# Patient Record
Sex: FEMALE | Race: BLACK OR AFRICAN AMERICAN | NOT HISPANIC OR LATINO | Employment: FULL TIME | URBAN - METROPOLITAN AREA
[De-identification: names, ages, dates, MRNs, and addresses within clinical notes are randomized per-mention and may not be internally consistent; named-entity substitution may affect disease eponyms.]

---

## 2016-09-22 LAB
HIGH RISK HPV 16 (PRECISION): NEGATIVE
HIGH RISK HPV 18/45 (PRECISION): NEGATIVE
PAP RECOMMENDATION EXT: NORMAL
PAP SMEAR: NORMAL

## 2017-05-22 ENCOUNTER — HISTORICAL (OUTPATIENT)
Dept: INTERNAL MEDICINE | Facility: CLINIC | Age: 52
End: 2017-05-22

## 2017-05-22 LAB
ABS NEUT (OLG): 3.63 X10(3)/MCL (ref 2.1–9.2)
ALBUMIN SERPL-MCNC: 4.4 GM/DL (ref 3.4–5)
ALBUMIN/GLOB SERPL: 1 {RATIO}
ALP SERPL-CCNC: 53 UNIT/L (ref 20–120)
ALT SERPL-CCNC: 10 UNIT/L
APPEARANCE, UA: ABNORMAL
AST SERPL-CCNC: 20 UNIT/L
BACTERIA #/AREA URNS AUTO: ABNORMAL /[HPF]
BASOPHILS # BLD AUTO: 0.04 X10(3)/MCL
BASOPHILS NFR BLD AUTO: 1 % (ref 0–1)
BILIRUB SERPL-MCNC: 0.5 MG/DL
BILIRUB UR QL STRIP: NEGATIVE
BILIRUBIN DIRECT+TOT PNL SERPL-MCNC: <0.1 MG/DL
BILIRUBIN DIRECT+TOT PNL SERPL-MCNC: >0.4 MG/DL
BUN SERPL-MCNC: 14 MG/DL (ref 7–25)
CALCIUM SERPL-MCNC: 6.8 MG/DL (ref 8.4–10.3)
CHLORIDE SERPL-SCNC: 102 MMOL/L (ref 96–110)
CHOLEST SERPL-MCNC: 300 MG/DL
CHOLEST/HDLC SERPL: 3.9 {RATIO} (ref 0–4.4)
CO2 SERPL-SCNC: 27 MMOL/L (ref 24–32)
COLOR UR: YELLOW
CREAT SERPL-MCNC: 0.64 MG/DL (ref 0.7–1.1)
CREAT UR-MCNC: 346.4 MG/DL
DEPRECATED CALCIDIOL+CALCIFEROL SERPL-MC: 32.25 NG/ML (ref 30–80)
EOSINOPHIL # BLD AUTO: 0.09 X10(3)/MCL
EOSINOPHIL NFR BLD AUTO: 2 % (ref 0–5)
ERYTHROCYTE [DISTWIDTH] IN BLOOD BY AUTOMATED COUNT: 19.7 % (ref 11.5–14.5)
EST. AVERAGE GLUCOSE BLD GHB EST-MCNC: 157 MG/DL
GLOBULIN SER-MCNC: 4.3 GM/ML (ref 2.3–3.5)
GLUCOSE (UA): NORMAL
GLUCOSE SERPL-MCNC: 124 MG/DL (ref 65–99)
HAV IGM SERPL QL IA: NONREACTIVE
HBA1C MFR BLD: 7.1 % (ref 4.7–5.6)
HBV CORE IGM SERPL QL IA: NONREACTIVE
HBV SURFACE AG SERPL QL IA: NEGATIVE
HCT VFR BLD AUTO: 27.2 % (ref 35–46)
HCV AB SERPL QL IA: NONREACTIVE
HDLC SERPL-MCNC: 77 MG/DL
HGB BLD-MCNC: 7.7 GM/DL (ref 12–16)
HGB UR QL STRIP: NEGATIVE
HIV 1+2 AB+HIV1 P24 AG SERPL QL IA: NONREACTIVE
HYALINE CASTS #/AREA URNS LPF: ABNORMAL /[LPF]
IMM GRANULOCYTES # BLD AUTO: 0.02 10*3/UL
IMM GRANULOCYTES NFR BLD AUTO: 0 %
KETONES UR QL STRIP: ABNORMAL
LDLC SERPL CALC-MCNC: 196 MG/DL (ref 0–130)
LEUKOCYTE ESTERASE UR QL STRIP: 75 LEU/UL
LYMPHOCYTES # BLD AUTO: 2.12 X10(3)/MCL
LYMPHOCYTES NFR BLD AUTO: 34 % (ref 15–40)
MCH RBC QN AUTO: 17.3 PG (ref 26–34)
MCHC RBC AUTO-ENTMCNC: 28.3 GM/DL (ref 31–37)
MCV RBC AUTO: 61.3 FL (ref 80–100)
MICROALBUMIN UR-MCNC: 17.3 MG/L (ref 0–19)
MICROALBUMIN/CREAT RATIO PNL UR: 5 MCG/MG CR (ref 0–29)
MONOCYTES # BLD AUTO: 0.3 X10(3)/MCL
MONOCYTES NFR BLD AUTO: 5 % (ref 4–12)
NEUTROPHILS # BLD AUTO: 3.63 X10(3)/MCL
NEUTROPHILS NFR BLD AUTO: 59 X10(3)/MCL
NITRITE UR QL STRIP: NEGATIVE
PH UR STRIP: 5.5 [PH] (ref 4.5–8)
PLATELET # BLD AUTO: 451 X10(3)/MCL (ref 130–400)
PMV BLD AUTO: 9.8 FL (ref 7.4–10.4)
POTASSIUM SERPL-SCNC: 3.5 MMOL/L (ref 3.6–5.2)
PROT SERPL-MCNC: 8.7 GM/DL (ref 6–8)
PROT UR QL STRIP: 20 MG/DL
RBC # BLD AUTO: 4.44 X10(6)/MCL (ref 4–5.2)
RBC #/AREA URNS AUTO: ABNORMAL /[HPF]
SODIUM SERPL-SCNC: 139 MMOL/L (ref 135–146)
SP GR UR STRIP: 1.02 (ref 1–1.03)
SQUAMOUS #/AREA URNS LPF: >100 /[LPF]
T4 FREE SERPL-MCNC: 0.84 NG/DL (ref 0.6–1.15)
TRIGL SERPL-MCNC: 133 MG/DL
TSH SERPL-ACNC: 12.76 MIU/L (ref 0.5–5)
UROBILINOGEN UR STRIP-ACNC: NORMAL
VLDLC SERPL CALC-MCNC: 27 MG/DL
WBC # SPEC AUTO: 6.2 X10(3)/MCL (ref 4.5–11)
WBC #/AREA URNS AUTO: ABNORMAL /HPF

## 2017-06-15 ENCOUNTER — HISTORICAL (OUTPATIENT)
Dept: INTERNAL MEDICINE | Facility: CLINIC | Age: 52
End: 2017-06-15

## 2017-06-28 ENCOUNTER — HISTORICAL (OUTPATIENT)
Dept: ADMINISTRATIVE | Facility: HOSPITAL | Age: 52
End: 2017-06-28

## 2017-06-28 LAB
BUN SERPL-MCNC: 12 MG/DL (ref 7–18)
CALCIUM SERPL-MCNC: 8.2 MG/DL (ref 8.5–10.1)
CHLORIDE SERPL-SCNC: 102 MMOL/L (ref 98–107)
CO2 SERPL-SCNC: 30 MMOL/L (ref 21–32)
CREAT SERPL-MCNC: 0.7 MG/DL (ref 0.6–1.3)
DEPRECATED CALCIDIOL+CALCIFEROL SERPL-MC: 34.4 NG/ML (ref 30–80)
GLUCOSE SERPL-MCNC: 132 MG/DL (ref 74–106)
HCT VFR BLD AUTO: 34.8 % (ref 35–46)
HGB BLD-MCNC: 10.4 GM/DL (ref 12–16)
MAGNESIUM SERPL-MCNC: 1.8 MG/DL (ref 1.8–2.4)
PHOSPHATE SERPL-MCNC: 4.2 MG/DL (ref 2.5–4.9)
POTASSIUM SERPL-SCNC: 3.8 MMOL/L (ref 3.5–5.1)
SODIUM SERPL-SCNC: 141 MMOL/L (ref 136–145)

## 2017-08-17 ENCOUNTER — HISTORICAL (OUTPATIENT)
Dept: ADMINISTRATIVE | Facility: HOSPITAL | Age: 52
End: 2017-08-17

## 2018-11-23 ENCOUNTER — HISTORICAL (OUTPATIENT)
Dept: INTERNAL MEDICINE | Facility: CLINIC | Age: 53
End: 2018-11-23

## 2019-03-29 ENCOUNTER — HISTORICAL (OUTPATIENT)
Dept: INTERNAL MEDICINE | Facility: CLINIC | Age: 54
End: 2019-03-29

## 2019-03-29 LAB
ABS NEUT (OLG): 2.57 X10(3)/MCL (ref 2.1–9.2)
ALBUMIN SERPL-MCNC: 3.8 GM/DL (ref 3.4–5)
ALBUMIN/GLOB SERPL: 0.9 RATIO (ref 1.1–2)
ALP SERPL-CCNC: 61 UNIT/L (ref 45–117)
ALT SERPL-CCNC: 12 UNIT/L (ref 12–78)
AST SERPL-CCNC: 21 UNIT/L (ref 15–37)
BASOPHILS # BLD AUTO: 0.02 X10(3)/MCL
BASOPHILS NFR BLD AUTO: 0 %
BILIRUB SERPL-MCNC: 0.3 MG/DL (ref 0.2–1)
BILIRUBIN DIRECT+TOT PNL SERPL-MCNC: <0.1 MG/DL
BILIRUBIN DIRECT+TOT PNL SERPL-MCNC: ABNORMAL MG/DL
BUN SERPL-MCNC: 12 MG/DL (ref 7–18)
CALCIUM SERPL-MCNC: 6.6 MG/DL (ref 8.5–10.1)
CHLORIDE SERPL-SCNC: 104 MMOL/L (ref 98–107)
CHOLEST SERPL-MCNC: 266 MG/DL
CHOLEST/HDLC SERPL: 4 {RATIO} (ref 0–4.4)
CO2 SERPL-SCNC: 27 MMOL/L (ref 21–32)
CREAT SERPL-MCNC: 0.7 MG/DL (ref 0.6–1.3)
CREAT UR-MCNC: 156 MG/DL
DEPRECATED CALCIDIOL+CALCIFEROL SERPL-MC: 43.18 NG/ML (ref 30–80)
EOSINOPHIL # BLD AUTO: 0.06 X10(3)/MCL
EOSINOPHIL NFR BLD AUTO: 1 %
ERYTHROCYTE [DISTWIDTH] IN BLOOD BY AUTOMATED COUNT: 12.7 % (ref 11.5–14.5)
EST. AVERAGE GLUCOSE BLD GHB EST-MCNC: 177 MG/DL
GLOBULIN SER-MCNC: 4.4 GM/ML (ref 2.3–3.5)
GLUCOSE SERPL-MCNC: 104 MG/DL (ref 74–106)
HBA1C MFR BLD: 7.8 % (ref 4.2–6.3)
HCT VFR BLD AUTO: 36.9 % (ref 35–46)
HDLC SERPL-MCNC: 66 MG/DL
HGB BLD-MCNC: 12.1 GM/DL (ref 12–16)
IMM GRANULOCYTES # BLD AUTO: 0.01 10*3/UL
IMM GRANULOCYTES NFR BLD AUTO: 0 %
LDLC SERPL CALC-MCNC: 181 MG/DL (ref 0–130)
LYMPHOCYTES # BLD AUTO: 2.16 X10(3)/MCL
LYMPHOCYTES NFR BLD AUTO: 42 % (ref 13–40)
MCH RBC QN AUTO: 29.8 PG (ref 26–34)
MCHC RBC AUTO-ENTMCNC: 32.8 GM/DL (ref 31–37)
MCV RBC AUTO: 90.9 FL (ref 80–100)
MICROALBUMIN UR-MCNC: 24.9 MG/L (ref 0–19)
MICROALBUMIN/CREAT RATIO PNL UR: 16 MCG/MG CR (ref 0–29)
MONOCYTES # BLD AUTO: 0.3 X10(3)/MCL
MONOCYTES NFR BLD AUTO: 6 % (ref 4–12)
NEUTROPHILS # BLD AUTO: 2.57 X10(3)/MCL
NEUTROPHILS NFR BLD AUTO: 50 X10(3)/MCL
OCCULT BLOOD, FECAL, IA: POSITIVE
PLATELET # BLD AUTO: 343 X10(3)/MCL (ref 130–400)
PMV BLD AUTO: 11.6 FL (ref 7.4–10.4)
POTASSIUM SERPL-SCNC: 4.2 MMOL/L (ref 3.5–5.1)
PROT SERPL-MCNC: 8.2 GM/DL (ref 6.4–8.2)
RBC # BLD AUTO: 4.06 X10(6)/MCL (ref 4–5.2)
SODIUM SERPL-SCNC: 139 MMOL/L (ref 136–145)
T4 FREE SERPL-MCNC: 1.45 NG/DL (ref 0.76–1.46)
TRIGL SERPL-MCNC: 97 MG/DL
TSH SERPL-ACNC: 0.42 MIU/L (ref 0.36–3.74)
VLDLC SERPL CALC-MCNC: 19 MG/DL
WBC # SPEC AUTO: 5.1 X10(3)/MCL (ref 4.5–11)

## 2019-08-20 ENCOUNTER — HISTORICAL (OUTPATIENT)
Dept: ADMINISTRATIVE | Facility: HOSPITAL | Age: 54
End: 2019-08-20

## 2019-08-20 LAB
ALBUMIN SERPL-MCNC: 4.5 GM/DL (ref 3.4–5)
BUN SERPL-MCNC: 11 MG/DL (ref 7–18)
CALCIUM SERPL-MCNC: 8.5 MG/DL (ref 8.5–10.1)
CHLORIDE SERPL-SCNC: 103 MMOL/L (ref 98–107)
CHOLEST SERPL-MCNC: 232 MG/DL
CHOLEST/HDLC SERPL: 3 {RATIO} (ref 0–4.4)
CO2 SERPL-SCNC: 28 MMOL/L (ref 21–32)
CREAT SERPL-MCNC: 0.8 MG/DL (ref 0.6–1.3)
CREAT UR-MCNC: 242 MG/DL
DEPRECATED CALCIDIOL+CALCIFEROL SERPL-MC: 38.22 NG/ML (ref 30–80)
EST. AVERAGE GLUCOSE BLD GHB EST-MCNC: 154 MG/DL
GLUCOSE SERPL-MCNC: 129 MG/DL (ref 74–106)
HBA1C MFR BLD: 7 % (ref 4.2–6.3)
HDLC SERPL-MCNC: 78 MG/DL
LDLC SERPL CALC-MCNC: 135 MG/DL (ref 0–130)
MAGNESIUM SERPL-MCNC: 2 MG/DL (ref 1.6–2.6)
MICROALBUMIN UR-MCNC: 18.4 MG/L (ref 0–19)
MICROALBUMIN/CREAT RATIO PNL UR: 7.6 MCG/MG CR (ref 0–29)
PHOSPHATE SERPL-MCNC: 5.3 MG/DL (ref 2.5–4.9)
PHOSPHATE SERPL-MCNC: 5.3 MG/DL (ref 2.5–4.9)
POTASSIUM SERPL-SCNC: 4 MMOL/L (ref 3.5–5.1)
PTH-INTACT SERPL-MCNC: <6.3 PG/ML (ref 18.4–80.1)
SODIUM SERPL-SCNC: 140 MMOL/L (ref 136–145)
TRIGL SERPL-MCNC: 97 MG/DL
TSH SERPL-ACNC: 1.55 MIU/L (ref 0.36–3.74)
VLDLC SERPL CALC-MCNC: 19 MG/DL

## 2019-11-06 ENCOUNTER — HISTORICAL (OUTPATIENT)
Dept: ADMINISTRATIVE | Facility: HOSPITAL | Age: 54
End: 2019-11-06

## 2019-11-06 LAB
ABS NEUT (OLG): 2.06 X10(3)/MCL (ref 2.1–9.2)
ALBUMIN SERPL-MCNC: 3.7 GM/DL (ref 3.4–5)
ALBUMIN SERPL-MCNC: 3.7 GM/DL (ref 3.4–5)
ALBUMIN/GLOB SERPL: 0.9 RATIO (ref 1.1–2)
ALP SERPL-CCNC: 72 UNIT/L (ref 45–117)
ALT SERPL-CCNC: 12 UNIT/L (ref 12–78)
AST SERPL-CCNC: 15 UNIT/L (ref 15–37)
BASOPHILS # BLD AUTO: 0 X10(3)/MCL (ref 0–0.2)
BASOPHILS NFR BLD AUTO: 1 %
BILIRUB SERPL-MCNC: 0.3 MG/DL (ref 0.2–1)
BILIRUBIN DIRECT+TOT PNL SERPL-MCNC: 0.1 MG/DL (ref 0–0.2)
BILIRUBIN DIRECT+TOT PNL SERPL-MCNC: 0.2 MG/DL
BUN SERPL-MCNC: 13 MG/DL (ref 7–18)
BUN SERPL-MCNC: 13 MG/DL (ref 7–18)
CALCIUM SERPL-MCNC: 7.4 MG/DL (ref 8.5–10.1)
CALCIUM SERPL-MCNC: 7.4 MG/DL (ref 8.5–10.1)
CHLORIDE SERPL-SCNC: 107 MMOL/L (ref 98–107)
CHLORIDE SERPL-SCNC: 107 MMOL/L (ref 98–107)
CO2 SERPL-SCNC: 27 MMOL/L (ref 21–32)
CO2 SERPL-SCNC: 27 MMOL/L (ref 21–32)
CREAT SERPL-MCNC: 0.8 MG/DL (ref 0.6–1.3)
CREAT SERPL-MCNC: 0.8 MG/DL (ref 0.6–1.3)
EOSINOPHIL # BLD AUTO: 0.1 X10(3)/MCL (ref 0–0.9)
EOSINOPHIL NFR BLD AUTO: 1 %
ERYTHROCYTE [DISTWIDTH] IN BLOOD BY AUTOMATED COUNT: 12.3 % (ref 11.5–14.5)
GLOBULIN SER-MCNC: 3.9 GM/ML (ref 2.3–3.5)
GLUCOSE SERPL-MCNC: 100 MG/DL (ref 74–106)
GLUCOSE SERPL-MCNC: 100 MG/DL (ref 74–106)
HCT VFR BLD AUTO: 37.1 % (ref 35–46)
HGB BLD-MCNC: 12 GM/DL (ref 12–16)
IMM GRANULOCYTES # BLD AUTO: 0.01 10*3/UL
IMM GRANULOCYTES NFR BLD AUTO: 0 %
LYMPHOCYTES # BLD AUTO: 2 X10(3)/MCL (ref 0.6–4.6)
LYMPHOCYTES NFR BLD AUTO: 45 %
MCH RBC QN AUTO: 29.6 PG (ref 26–34)
MCHC RBC AUTO-ENTMCNC: 32.3 GM/DL (ref 31–37)
MCV RBC AUTO: 91.6 FL (ref 80–100)
MONOCYTES # BLD AUTO: 0.3 X10(3)/MCL (ref 0.1–1.3)
MONOCYTES NFR BLD AUTO: 6 %
NEUTROPHILS # BLD AUTO: 2.06 X10(3)/MCL (ref 2.1–9.2)
NEUTROPHILS NFR BLD AUTO: 46 %
PHOSPHATE SERPL-MCNC: 5.4 MG/DL (ref 2.5–4.9)
PLATELET # BLD AUTO: 353 X10(3)/MCL (ref 130–400)
PMV BLD AUTO: 10.6 FL (ref 7.4–10.4)
POTASSIUM SERPL-SCNC: 3.8 MMOL/L (ref 3.5–5.1)
POTASSIUM SERPL-SCNC: 3.8 MMOL/L (ref 3.5–5.1)
PROT SERPL-MCNC: 7.6 GM/DL (ref 6.4–8.2)
RBC # BLD AUTO: 4.05 X10(6)/MCL (ref 4–5.2)
SODIUM SERPL-SCNC: 141 MMOL/L (ref 136–145)
SODIUM SERPL-SCNC: 141 MMOL/L (ref 136–145)
TSH SERPL-ACNC: 0.4 MIU/L (ref 0.36–3.74)
WBC # SPEC AUTO: 4.5 X10(3)/MCL (ref 4.5–11)

## 2019-11-18 ENCOUNTER — HISTORICAL (OUTPATIENT)
Dept: ADMINISTRATIVE | Facility: HOSPITAL | Age: 54
End: 2019-11-18

## 2019-11-20 ENCOUNTER — HISTORICAL (OUTPATIENT)
Dept: ADMINISTRATIVE | Facility: HOSPITAL | Age: 54
End: 2019-11-20

## 2019-11-20 LAB
ALBUMIN SERPL-MCNC: 3.8 GM/DL (ref 3.4–5)
BUN SERPL-MCNC: 14 MG/DL (ref 7–18)
CALCIUM SERPL-MCNC: 8.1 MG/DL (ref 8.5–10.1)
CHLORIDE SERPL-SCNC: 106 MMOL/L (ref 98–107)
CO2 SERPL-SCNC: 26 MMOL/L (ref 21–32)
CREAT SERPL-MCNC: 0.7 MG/DL (ref 0.6–1.3)
GLUCOSE SERPL-MCNC: 110 MG/DL (ref 74–106)
PHOSPHATE SERPL-MCNC: 4.3 MG/DL (ref 2.5–4.9)
POTASSIUM SERPL-SCNC: 3.8 MMOL/L (ref 3.5–5.1)
SODIUM SERPL-SCNC: 140 MMOL/L (ref 136–145)

## 2019-12-11 ENCOUNTER — HISTORICAL (OUTPATIENT)
Dept: RADIOLOGY | Facility: HOSPITAL | Age: 54
End: 2019-12-11

## 2019-12-23 ENCOUNTER — HISTORICAL (OUTPATIENT)
Dept: RADIOLOGY | Facility: HOSPITAL | Age: 54
End: 2019-12-23

## 2020-02-24 ENCOUNTER — HISTORICAL (OUTPATIENT)
Dept: ADMINISTRATIVE | Facility: HOSPITAL | Age: 55
End: 2020-02-24

## 2020-02-24 LAB
ALBUMIN SERPL-MCNC: 3.6 GM/DL (ref 3.4–5)
BUN SERPL-MCNC: 14 MG/DL (ref 7–18)
CALCIUM SERPL-MCNC: 7.4 MG/DL (ref 8.5–10.1)
CHLORIDE SERPL-SCNC: 106 MMOL/L (ref 98–107)
CO2 SERPL-SCNC: 30 MMOL/L (ref 21–32)
CREAT SERPL-MCNC: 0.6 MG/DL (ref 0.6–1.3)
GLUCOSE SERPL-MCNC: 114 MG/DL (ref 74–106)
HIV 1+2 AB+HIV1 P24 AG SERPL QL IA: NONREACTIVE
PHOSPHATE SERPL-MCNC: 5.4 MG/DL (ref 2.5–4.9)
POTASSIUM SERPL-SCNC: 3.8 MMOL/L (ref 3.5–5.1)
SODIUM SERPL-SCNC: 140 MMOL/L (ref 136–145)
T PALLIDUM AB SER QL: NONREACTIVE
T4 FREE SERPL-MCNC: 1.21 NG/DL (ref 0.76–1.46)
TSH SERPL-ACNC: 0.06 MIU/L (ref 0.36–3.74)

## 2020-02-25 LAB
CALCIUM 24H UR-MCNC: 154.8 MG/24HR (ref 0–250)
CREAT 24H UR-MCNC: 0.8 GM/24HR (ref 0.6–2.1)
CREAT UR-MCNC: 42 MG/DL

## 2020-03-18 ENCOUNTER — HISTORICAL (OUTPATIENT)
Dept: NEPHROLOGY | Facility: CLINIC | Age: 55
End: 2020-03-18

## 2020-03-18 LAB
ALBUMIN SERPL-MCNC: 3.9 GM/DL (ref 3.4–5)
BUN SERPL-MCNC: 14 MG/DL (ref 7–18)
CALCIUM SERPL-MCNC: 7.1 MG/DL (ref 8.5–10.1)
CHLORIDE SERPL-SCNC: 106 MMOL/L (ref 98–107)
CO2 SERPL-SCNC: 29 MMOL/L (ref 21–32)
CREAT SERPL-MCNC: 0.7 MG/DL (ref 0.6–1.3)
GLUCOSE SERPL-MCNC: 79 MG/DL (ref 74–106)
PHOSPHATE SERPL-MCNC: 5.3 MG/DL (ref 2.5–4.9)
POTASSIUM SERPL-SCNC: 3.7 MMOL/L (ref 3.5–5.1)
SODIUM SERPL-SCNC: 141 MMOL/L (ref 136–145)

## 2020-05-06 ENCOUNTER — HISTORICAL (OUTPATIENT)
Dept: ADMINISTRATIVE | Facility: HOSPITAL | Age: 55
End: 2020-05-06

## 2020-05-06 LAB
ALBUMIN SERPL-MCNC: 4.2 GM/DL (ref 3.4–5)
BUN SERPL-MCNC: 14 MG/DL (ref 7–18)
CALCIUM SERPL-MCNC: 8.5 MG/DL (ref 8.5–10.1)
CHLORIDE SERPL-SCNC: 104 MMOL/L (ref 98–107)
CO2 SERPL-SCNC: 29 MMOL/L (ref 21–32)
CREAT SERPL-MCNC: 0.7 MG/DL (ref 0.6–1.3)
GLUCOSE SERPL-MCNC: 107 MG/DL (ref 74–106)
PHOSPHATE SERPL-MCNC: 4.8 MG/DL (ref 2.5–4.9)
POTASSIUM SERPL-SCNC: 3.6 MMOL/L (ref 3.5–5.1)
SODIUM SERPL-SCNC: 138 MMOL/L (ref 136–145)
TSH SERPL-ACNC: 0.54 MIU/L (ref 0.36–3.74)

## 2020-06-19 ENCOUNTER — HISTORICAL (OUTPATIENT)
Dept: ADMINISTRATIVE | Facility: HOSPITAL | Age: 55
End: 2020-06-19

## 2020-06-19 LAB
BILIRUB SERPL-MCNC: NEGATIVE MG/DL
BLOOD URINE, POC: NEGATIVE
CLARITY, POC UA: NORMAL
COLOR, POC UA: YELLOW
GLUCOSE UR QL STRIP: NEGATIVE
KETONES UR QL STRIP: NEGATIVE
LEUKOCYTE EST, POC UA: NEGATIVE
NITRITE, POC UA: NEGATIVE
PH, POC UA: 7
PROTEIN, POC: NEGATIVE
SPECIFIC GRAVITY, POC UA: 1.02
UROBILINOGEN, POC UA: NORMAL

## 2020-06-21 LAB — FINAL CULTURE: NORMAL

## 2021-02-26 ENCOUNTER — HISTORICAL (OUTPATIENT)
Dept: ADMINISTRATIVE | Facility: HOSPITAL | Age: 56
End: 2021-02-26

## 2021-02-26 ENCOUNTER — HISTORICAL (OUTPATIENT)
Dept: LAB | Facility: HOSPITAL | Age: 56
End: 2021-02-26

## 2021-02-26 LAB
ABS NEUT (OLG): 2.92 X10(3)/MCL (ref 2.1–9.2)
ALBUMIN SERPL-MCNC: 4 GM/DL (ref 3.5–5)
ALBUMIN/GLOB SERPL: 1 RATIO (ref 1.1–2)
ALP SERPL-CCNC: 81 UNIT/L (ref 40–150)
ALT SERPL-CCNC: 20 UNIT/L (ref 0–55)
APPEARANCE, UA: ABNORMAL
AST SERPL-CCNC: 30 UNIT/L (ref 5–34)
BACTERIA #/AREA URNS AUTO: ABNORMAL /HPF
BASOPHILS # BLD AUTO: 0 X10(3)/MCL (ref 0–0.2)
BASOPHILS NFR BLD AUTO: 0 %
BILIRUB SERPL-MCNC: 0.3 MG/DL
BILIRUB UR QL STRIP: NEGATIVE
BILIRUBIN DIRECT+TOT PNL SERPL-MCNC: 0.1 MG/DL (ref 0–0.5)
BILIRUBIN DIRECT+TOT PNL SERPL-MCNC: 0.2 MG/DL (ref 0–0.8)
BUN SERPL-MCNC: 13 MG/DL (ref 9.8–20.1)
CALCIUM SERPL-MCNC: 5.7 MG/DL (ref 8.4–10.2)
CHLORIDE SERPL-SCNC: 101 MMOL/L (ref 98–107)
CHOLEST SERPL-MCNC: 199 MG/DL
CHOLEST/HDLC SERPL: 4 {RATIO} (ref 0–5)
CO2 SERPL-SCNC: 27 MMOL/L (ref 22–29)
COLOR UR: ABNORMAL
CREAT SERPL-MCNC: 0.77 MG/DL (ref 0.55–1.02)
CREAT UR-MCNC: 79.1 MG/DL (ref 45–106)
DEPRECATED CALCIDIOL+CALCIFEROL SERPL-MC: 69.7 NG/ML (ref 30–80)
EOSINOPHIL # BLD AUTO: 0.1 X10(3)/MCL (ref 0–0.9)
EOSINOPHIL NFR BLD AUTO: 1 %
ERYTHROCYTE [DISTWIDTH] IN BLOOD BY AUTOMATED COUNT: 12.9 % (ref 11.5–14.5)
EST. AVERAGE GLUCOSE BLD GHB EST-MCNC: 159.9 MG/DL
GLOBULIN SER-MCNC: 4.1 GM/DL (ref 2.4–3.5)
GLUCOSE (UA): 100 MG/DL
GLUCOSE SERPL-MCNC: 101 MG/DL (ref 74–100)
HBA1C MFR BLD: 7.2 %
HCT VFR BLD AUTO: 39 % (ref 35–46)
HDLC SERPL-MCNC: 56 MG/DL (ref 35–60)
HGB BLD-MCNC: 12.9 GM/DL (ref 12–16)
HGB UR QL STRIP: NEGATIVE
HYALINE CASTS #/AREA URNS LPF: ABNORMAL /LPF
IMM GRANULOCYTES # BLD AUTO: 0.01 10*3/UL
IMM GRANULOCYTES NFR BLD AUTO: 0 %
KETONES UR QL STRIP: NEGATIVE
LDLC SERPL CALC-MCNC: 128 MG/DL (ref 50–140)
LEUKOCYTE ESTERASE UR QL STRIP: NEGATIVE
LYMPHOCYTES # BLD AUTO: 2.3 X10(3)/MCL (ref 0.6–4.6)
LYMPHOCYTES NFR BLD AUTO: 40 %
MCH RBC QN AUTO: 28.9 PG (ref 26–34)
MCHC RBC AUTO-ENTMCNC: 33.1 GM/DL (ref 31–37)
MCV RBC AUTO: 87.4 FL (ref 80–100)
MICROALBUMIN UR-MCNC: 8.3 MG/L
MICROALBUMIN/CREAT RATIO PNL UR: 10.5 MG/GM CR (ref 0–30)
MONOCYTES # BLD AUTO: 0.3 X10(3)/MCL (ref 0.1–1.3)
MONOCYTES NFR BLD AUTO: 6 %
NEUTROPHILS # BLD AUTO: 2.92 X10(3)/MCL (ref 2.1–9.2)
NEUTROPHILS NFR BLD AUTO: 52 %
NITRITE UR QL STRIP: NEGATIVE
PH UR STRIP: 6.5 [PH] (ref 4.5–8)
PHOSPHATE SERPL-MCNC: 6 MG/DL (ref 2.3–4.7)
PLATELET # BLD AUTO: 261 X10(3)/MCL (ref 130–400)
PMV BLD AUTO: 11.1 FL (ref 7.4–10.4)
POTASSIUM SERPL-SCNC: 4.4 MMOL/L (ref 3.5–5.1)
PROT SERPL-MCNC: 8.1 GM/DL (ref 6.4–8.3)
PROT UR QL STRIP: NEGATIVE
RBC # BLD AUTO: 4.46 X10(6)/MCL (ref 4–5.2)
RBC #/AREA URNS AUTO: ABNORMAL /HPF
SODIUM SERPL-SCNC: 141 MMOL/L (ref 136–145)
SP GR UR STRIP: 1.01 (ref 1–1.03)
SQUAMOUS #/AREA URNS LPF: >100 /LPF
T4 SERPL-MCNC: 8.05 UG/DL (ref 4.87–11.72)
TRIGL SERPL-MCNC: 73 MG/DL (ref 37–140)
TSH SERPL-ACNC: 1.28 UIU/ML (ref 0.35–4.94)
UROBILINOGEN UR STRIP-ACNC: NORMAL
VLDLC SERPL CALC-MCNC: 15 MG/DL
WBC # SPEC AUTO: 5.6 X10(3)/MCL (ref 4.5–11)
WBC #/AREA URNS AUTO: ABNORMAL /HPF

## 2021-02-28 LAB — FINAL CULTURE: NORMAL

## 2021-03-10 ENCOUNTER — HISTORICAL (OUTPATIENT)
Dept: ADMINISTRATIVE | Facility: HOSPITAL | Age: 56
End: 2021-03-10

## 2021-03-10 LAB
ALBUMIN SERPL-MCNC: 4.2 GM/DL (ref 3.5–5)
BUN SERPL-MCNC: 16 MG/DL (ref 9.8–20.1)
CALCIUM SERPL-MCNC: 7.6 MG/DL (ref 8.4–10.2)
CHLORIDE SERPL-SCNC: 102 MMOL/L (ref 98–107)
CO2 SERPL-SCNC: 28 MMOL/L (ref 22–29)
CREAT SERPL-MCNC: 0.79 MG/DL (ref 0.55–1.02)
GLUCOSE SERPL-MCNC: 132 MG/DL (ref 74–100)
PHOSPHATE SERPL-MCNC: 4.6 MG/DL (ref 2.3–4.7)
POTASSIUM SERPL-SCNC: 3.9 MMOL/L (ref 3.5–5.1)
SODIUM SERPL-SCNC: 143 MMOL/L (ref 136–145)

## 2021-04-01 ENCOUNTER — HISTORICAL (OUTPATIENT)
Dept: ENDOCRINOLOGY | Facility: CLINIC | Age: 56
End: 2021-04-01

## 2021-04-01 LAB
ALBUMIN SERPL-MCNC: 4.1 GM/DL (ref 3.5–5)
BUN SERPL-MCNC: 12.2 MG/DL (ref 9.8–20.1)
CALCIUM SERPL-MCNC: 8.8 MG/DL (ref 8.4–10.2)
CHLORIDE SERPL-SCNC: 101 MMOL/L (ref 98–107)
CO2 SERPL-SCNC: 31 MMOL/L (ref 22–29)
CREAT SERPL-MCNC: 0.79 MG/DL (ref 0.55–1.02)
GLUCOSE SERPL-MCNC: 157 MG/DL (ref 74–100)
PHOSPHATE SERPL-MCNC: 4.3 MG/DL (ref 2.3–4.7)
POTASSIUM SERPL-SCNC: 4.1 MMOL/L (ref 3.5–5.1)
SODIUM SERPL-SCNC: 141 MMOL/L (ref 136–145)

## 2021-06-29 ENCOUNTER — HISTORICAL (OUTPATIENT)
Dept: ADMINISTRATIVE | Facility: HOSPITAL | Age: 56
End: 2021-06-29

## 2021-06-29 LAB
ALBUMIN SERPL-MCNC: 4.5 GM/DL (ref 3.5–5)
ALBUMIN SERPL-MCNC: 4.5 GM/DL (ref 3.5–5)
ALBUMIN/GLOB SERPL: 1 RATIO (ref 1.1–2)
ALP SERPL-CCNC: 71 UNIT/L (ref 40–150)
ALT SERPL-CCNC: 17 UNIT/L (ref 0–55)
AST SERPL-CCNC: 18 UNIT/L (ref 5–34)
BILIRUB SERPL-MCNC: 0.3 MG/DL
BILIRUBIN DIRECT+TOT PNL SERPL-MCNC: 0.1 MG/DL (ref 0–0.5)
BILIRUBIN DIRECT+TOT PNL SERPL-MCNC: 0.2 MG/DL (ref 0–0.8)
BUN SERPL-MCNC: 12.4 MG/DL (ref 9.8–20.1)
BUN SERPL-MCNC: 12.4 MG/DL (ref 9.8–20.1)
CALCIUM SERPL-MCNC: 9.8 MG/DL (ref 8.4–10.2)
CALCIUM SERPL-MCNC: 9.8 MG/DL (ref 8.4–10.2)
CHLORIDE SERPL-SCNC: 102 MMOL/L (ref 98–107)
CHLORIDE SERPL-SCNC: 102 MMOL/L (ref 98–107)
CO2 SERPL-SCNC: 31 MMOL/L (ref 22–29)
CO2 SERPL-SCNC: 31 MMOL/L (ref 22–29)
CREAT SERPL-MCNC: 0.78 MG/DL (ref 0.55–1.02)
CREAT SERPL-MCNC: 0.78 MG/DL (ref 0.55–1.02)
CREAT UR-MCNC: 173.7 MG/DL (ref 45–106)
DEPRECATED CALCIDIOL+CALCIFEROL SERPL-MC: 70.7 NG/ML (ref 30–80)
GLOBULIN SER-MCNC: 4.3 GM/DL (ref 2.4–3.5)
GLUCOSE SERPL-MCNC: 114 MG/DL (ref 74–100)
GLUCOSE SERPL-MCNC: 114 MG/DL (ref 74–100)
PHOSPHATE SERPL-MCNC: 5 MG/DL (ref 2.3–4.7)
POTASSIUM SERPL-SCNC: 3.9 MMOL/L (ref 3.5–5.1)
POTASSIUM SERPL-SCNC: 3.9 MMOL/L (ref 3.5–5.1)
PROT SERPL-MCNC: 8.8 GM/DL (ref 6.4–8.3)
PROT UR STRIP-MCNC: 12.4 MG/DL
PROT/CREAT UR-RTO: 71.4 MG/GM CR
SODIUM SERPL-SCNC: 143 MMOL/L (ref 136–145)
SODIUM SERPL-SCNC: 143 MMOL/L (ref 136–145)
T3FREE SERPL-MCNC: 2.45 PG/ML (ref 1.71–3.71)
T4 FREE SERPL-MCNC: 1.12 NG/DL (ref 0.7–1.48)
TSH SERPL-ACNC: 0.08 UIU/ML (ref 0.35–4.94)

## 2021-11-04 ENCOUNTER — HISTORICAL (OUTPATIENT)
Dept: ENDOCRINOLOGY | Facility: CLINIC | Age: 56
End: 2021-11-04

## 2021-11-04 LAB
ALBUMIN SERPL-MCNC: 4.2 GM/DL (ref 3.5–5)
BUN SERPL-MCNC: 12 MG/DL (ref 9.8–20.1)
CALCIUM SERPL-MCNC: 9.8 MG/DL (ref 8.7–10.5)
CHLORIDE SERPL-SCNC: 103 MMOL/L (ref 98–107)
CO2 SERPL-SCNC: 28 MMOL/L (ref 22–29)
CREAT SERPL-MCNC: 0.81 MG/DL (ref 0.55–1.02)
GLUCOSE SERPL-MCNC: 177 MG/DL (ref 74–100)
PHOSPHATE SERPL-MCNC: 4 MG/DL (ref 2.3–4.7)
POTASSIUM SERPL-SCNC: 3.7 MMOL/L (ref 3.5–5.1)
SODIUM SERPL-SCNC: 141 MMOL/L (ref 136–145)
TSH SERPL-ACNC: 0.25 UIU/ML (ref 0.35–4.94)

## 2021-11-23 ENCOUNTER — HISTORICAL (OUTPATIENT)
Dept: ADMINISTRATIVE | Facility: HOSPITAL | Age: 56
End: 2021-11-23

## 2021-11-23 LAB
ALBUMIN SERPL-MCNC: 4 GM/DL (ref 3.5–5)
BUN SERPL-MCNC: 13.1 MG/DL (ref 9.8–20.1)
CALCIUM SERPL-MCNC: 10.2 MG/DL (ref 8.7–10.5)
CHLORIDE SERPL-SCNC: 104 MMOL/L (ref 98–107)
CO2 SERPL-SCNC: 27 MMOL/L (ref 22–29)
CREAT SERPL-MCNC: 0.78 MG/DL (ref 0.55–1.02)
GLUCOSE SERPL-MCNC: 144 MG/DL (ref 74–100)
PHOSPHATE SERPL-MCNC: 4.7 MG/DL (ref 2.3–4.7)
POTASSIUM SERPL-SCNC: 3.9 MMOL/L (ref 3.5–5.1)
SODIUM SERPL-SCNC: 141 MMOL/L (ref 136–145)

## 2022-01-11 ENCOUNTER — HISTORICAL (OUTPATIENT)
Dept: RADIOLOGY | Facility: HOSPITAL | Age: 57
End: 2022-01-11

## 2022-03-03 ENCOUNTER — HISTORICAL (OUTPATIENT)
Dept: ADMINISTRATIVE | Facility: HOSPITAL | Age: 57
End: 2022-03-03

## 2022-03-03 LAB
ALBUMIN SERPL-MCNC: 4.2 G/DL (ref 3.5–5)
BUN SERPL-MCNC: 12.8 MG/DL (ref 9.8–20.1)
CALCIUM SERPL-MCNC: 9 MG/DL (ref 8.7–10.5)
CHLORIDE SERPL-SCNC: 103 MMOL/L (ref 98–107)
CHOLEST SERPL-MCNC: 231 MG/DL
CHOLEST/HDLC SERPL: 3 {RATIO} (ref 0–5)
CO2 SERPL-SCNC: 30 MMOL/L (ref 22–29)
CREAT SERPL-MCNC: 0.76 MG/DL (ref 0.55–1.02)
EST. AVERAGE GLUCOSE BLD GHB EST-MCNC: 145.6 MG/DL
GLUCOSE SERPL-MCNC: 130 MG/DL (ref 74–100)
HBA1C MFR BLD: 6.7 %
HDLC SERPL-MCNC: 70 MG/DL (ref 35–60)
HEMOLYSIS INTERF INDEX SERPL-ACNC: 12
ICTERIC INTERF INDEX SERPL-ACNC: 0
LDLC SERPL CALC-MCNC: 146 MG/DL (ref 50–140)
LIPEMIC INTERF INDEX SERPL-ACNC: 3
PHOSPHATE SERPL-MCNC: 5.1 MG/DL (ref 2.3–4.7)
POTASSIUM SERPL-SCNC: 3.9 MMOL/L (ref 3.5–5.1)
SODIUM SERPL-SCNC: 141 MMOL/L (ref 136–145)
TRIGL SERPL-MCNC: 73 MG/DL (ref 37–140)
TSH SERPL-ACNC: 0.69 M[IU]/L (ref 0.35–4.94)
VLDLC SERPL CALC-MCNC: 15 MG/DL

## 2022-04-11 ENCOUNTER — HISTORICAL (OUTPATIENT)
Dept: ADMINISTRATIVE | Facility: HOSPITAL | Age: 57
End: 2022-04-11
Payer: COMMERCIAL

## 2022-04-28 VITALS
WEIGHT: 156.94 LBS | SYSTOLIC BLOOD PRESSURE: 131 MMHG | OXYGEN SATURATION: 100 % | DIASTOLIC BLOOD PRESSURE: 71 MMHG | HEIGHT: 65 IN | BODY MASS INDEX: 26.15 KG/M2

## 2022-05-04 PROBLEM — D50.9 IRON DEFICIENCY ANEMIA: Status: ACTIVE | Noted: 2022-05-04

## 2022-05-04 PROBLEM — E11.9 TYPE 2 DIABETES MELLITUS: Status: ACTIVE | Noted: 2022-05-04

## 2022-05-04 PROBLEM — E55.9 VITAMIN D DEFICIENCY: Status: ACTIVE | Noted: 2022-05-04

## 2022-05-04 PROBLEM — E89.0 POSTOPERATIVE HYPOTHYROIDISM: Status: ACTIVE | Noted: 2022-05-04

## 2022-05-04 PROBLEM — I10 PRIMARY HYPERTENSION: Status: ACTIVE | Noted: 2022-05-04

## 2022-05-04 PROBLEM — E83.51 HYPOCALCEMIA: Status: ACTIVE | Noted: 2022-05-04

## 2022-05-04 NOTE — HISTORICAL [facility] CERNER
This is a historical note converted from [employer]. Formatting and pictures may have been removed.  Please reference [employer] for original formatting and attached multimedia. Chief Complaint  R ear pain X 1 day. Burning with urination X 2 days.  History of Present Illness  Pt is a 54-year-old female, here today for right ear pain x1 day and burning with urination x2 days. Denies flank pain, abd pain, n/v/d.  ?Denies?Cough,?none.?Denies?runny nose.?Denies?nasal congestion.??Denies?fever.??Denies?body aches.? ?Denies?sinus pressure.??Denies?headaches.??Denies?SOB.?Denies?wheezing.?Denies?sore throat.  Currently taking?no medication for symptoms.  ?  ?   ?  Review of Systems  Constitutional: negative except as stated in HPI  Eye: negative except as stated in HPI  ENT: negative except as stated in HPI  Respiratory: negative except as stated in HPI  Cardiovascular: negative except as stated in HPI  Gastrointestinal: negative except as stated in HPI  Genitourinary: negative except as stated in HPI  Hema/Lymph: negative except as stated in HPI  Endocrine: negative except as stated in HPI  Immunologic: negative except as stated in HPI  Musculoskeletal: negative except as stated in HPI  Integumentary: negative except as stated in HPI  Neurologic: negative except as stated in HPI  All Other ROS_ negative except as stated in HPI  ?  ?  Physical Exam  Vitals & Measurements  T:?37.1? ?C (Oral)? HR:?66(Peripheral)? RR:?18? BP:?146/83? SpO2:?100%?  HT:?163?cm? WT:?70?kg? BMI:?26.35? LMP:?05/06/2020 00:00 CDT?  General: Alert and oriented, No acute distress.  HENT: Normocephalic. Right ear: tympanic membrane is?erythematous, non-bulging. ? External canal is?non erythematous?  Left ear: tympanic membrane is?erythematous, non-bulging. ?? External canal is?non erythematous?  Respiratory: Lungs are clear to auscultation, Respirations are non-labored, Breath sounds are equal, Symmetrical chest wall expansion.  Cardiovascular: Normal rate,  Regular rhythm, No murmur.  Musculoskeletal: Normal range of motion.  Integumentary: Warm, Dry, Intact.  Neurologic: No focal deficits, Cranial Nerves II-XII are grossly intact.  ?  Assessment/Plan  1.?Otitis externa of right ear?H60.91  medication as ordered.?Avoid getting water in ear while bathing or swimming. Dry canal with hair dryer on lowest setting. Avoid ear trauma. RTC if symptoms do not improve. F/u with pcp. ER precautions.  Ordered:  ciprofloxacin-dexamethasone otic, 4 drop(s), Otic, BID, in affected ear, X 7 day(s), # 7.5 mL, 0 Refill(s), Pharmacy: [facility], 163, cm, Height/Length Dosing, 06/19/20 11:49:00 CDT, 70, kg, Weight Dosing, 06/19/20 11:49:00 CDT  Office/Outpatient Visit Level 4 Established [id number] PC, Otitis externa of right ear  Burning with urination, 06/19/20 12:13:00 CDT  ?  2.?Burning with urination?R30.0  ?UA - leuk, - nit, - blood. Will sent urine to lab for C&S. ?Maintain adequate hydration. ?If no improvement in symptoms in 2 to 3 days or if symptoms worsen the return to clinic. ?ER precautions.  Ordered:  phenazopyridine, 200 mg = 1 tab(s), Oral, TID, X 3 day(s), # 9 tab(s), 0 Refill(s), Pharmacy: [facility], 163, cm, Height/Length Dosing, 06/19/20 11:49:00 CDT, 70, kg, Weight Dosing, 06/19/20 11:49:00 CDT  Office/Outpatient Visit Level 4 Established [id number] PC, Otitis externa of right ear  Burning with urination, 06/19/20 12:13:00 CDT  Urine Culture [id number], Stat collect, 06/19/20 12:03:00 CDT, Urine, Nurse collect, Stop date 06/19/20 12:03:00 CDT, Burning with urination  ?   Problem List/Past Medical History  Ongoing  DM2 (diabetes mellitus, type 2)  HTN (hypertension)  KATHI (iron deficiency anemia)  Uterine fibroid  Historical  Pregnant  Pregnant  Pregnant  Procedure/Surgical History  Transfusion of Nonautologous Red Blood Cells into Peripheral Vein, Percutaneous Approach (06/17/2017)  Bilateral tubal ligation  Thyroidectomy   Medications  atorvastatin 80  mg oral tablet, 80 mg= 1 tab(s), Oral, Daily, 6 refills  calcitriol 0.25 mcg oral capsule, 0.25 mcg= 1 cap(s), Oral, BID,? ?Not taking  calcitriol 0.25 mcg oral capsule, 0.25 mcg= 1 cap(s), Oral, BID,? ?Not taking  Caltrate 600 mg oral tablet, 1200 mg= 2 tab(s), Oral, TID  Caltrate 600 mg oral tablet, See Instructions, 1 refills,? ?Not taking  Ciprodex 0.3%-0.1% otic suspension, 4 drop(s), Otic, BID  levothyroxine 88 mcg (0.088 mg) oral tablet, 88 mcg= 1 tab(s), Oral, Daily, 1 refills  metFORMIN 1000 mg oral tablet, extended release, 2000 mg= 2 tab(s), Oral, Daily, 6 refills  metoprolol succinate 25 mg oral tablet extended release, 25 mg= 1 tab(s), Oral, Daily, 6 refills  Norvasc 10 mg oral tablet, 10 mg= 1 tab(s), Oral, Daily, 6 refills  Pyridium 200 mg oral tablet, 200 mg= 1 tab(s), Oral, TID  Allergies  cloNIDine  ACE inhibitors  glipiZIDE?(cold sweats)  lisinopril  Social History  Abuse/Neglect  No, 06/19/2020  Alcohol  Never, 02/26/2020  Employment/School  Employed, Highest education level: Some college. Operates hazardous equipment: No., 07/31/2015  Exercise  Exercise frequency: 3-4 times/week. Exercise type: Walking, Running., 02/26/2020  Home/Environment  Lives with Spouse. Living situation: Home/Independent. Alcohol abuse in household: No. Substance abuse in household: No. Smoker in household: No. Injuries/Abuse/Neglect in household: No. Feels unsafe at home: No. Safe place to go: Yes. Agency(s)/Others notified: No. Family/Friends available for support: Yes. Concern for family members at home: No., 04/08/2016  Nutrition/Health  Regular, Fair, 02/26/2020  Sexual  Sexually active: Yes. Number of current partners 1. Sexual orientation: Straight or heterosexual. Gender Identity Identifies as female. No, 01/29/2020  Substance Use  Never, 07/31/2015  Tobacco  Never (less than 100 in lifetime), N/A, 06/19/2020  Family History  COPD (chronic obstructive pulmonary disease).: Mother.  Hypertension.:  Mother.  Hyperthyroidism.: Mother.  Immunizations  Vaccine Date Status   influenza virus vaccine, inactivated 10/07/2019 Given   influenza virus vaccine, inactivated 12/21/2017 Given   tetanus/diphtheria/pertussis, acel(Tdap) 05/11/2017 Given   Health Maintenance  Health Maintenance  ???Pending?(in the next year)  ??? ??OverDue  ??? ? ? ?Diabetes Maintenance-Foot Exam due??05/11/18??and every 1??year(s)  ??? ? ? ?Hypertension Management-Education due??05/11/18??and every 1??year(s)  ??? ? ? ?Cervical Cancer Screening due??09/22/19??and every 3??year(s)  ??? ? ? ?Colorectal Screening due??03/28/20??and every 1??year(s)  ??? ??Due?  ??? ? ? ?Aspirin Therapy for CVD Prevention due??06/19/20??and every 1??year(s)  ??? ? ? ?Diabetes Maintenance-Eye Exam due??06/19/20??and every?  ??? ??Refused?  ??? ? ? ?Alcohol Misuse Screening due??01/01/20??and every 1??year(s)  ??? ??Due In Future?  ??? ? ? ?Diabetes Maintenance-Fasting Lipid Profile not due until??08/19/20??and every 1??year(s)  ??? ? ? ?Diabetes Maintenance-HgbA1c not due until??08/19/20??and every 1??year(s)  ??? ? ? ?Diabetes Maintenance-Microalbumin not due until??08/20/20??and every 1??year(s)  ??? ? ? ?Smoking Cessation (Diabetes) not due until??09/11/20??and every 2??year(s)  ??? ? ? ?ADL Screening not due until??10/07/20??and every 1??year(s)  ??? ? ? ?Obesity Screening not due until??01/01/21??and every 1??year(s)  ??? ? ? ?Blood Pressure Screening not due until??02/25/21??and every 1??year(s)  ??? ? ? ?Body Mass Index Check not due until??02/25/21??and every 1??year(s)  ??? ? ? ?Depression Screening not due until??02/25/21??and every 1??year(s)  ??? ? ? ?Hypertension Management-Blood Pressure not due until??02/25/21??and every 1??year(s)  ??? ? ? ?Diabetes Maintenance-Medication Prescribed not due until??02/26/21??and every 1??year(s)  ??? ? ? ?Hypertension Management-BMP not due until??05/06/21??and every 1??year(s)  ??? ? ? ?Diabetes Maintenance-Serum  Creatinine not due until??05/06/21??and every 1??year(s)  ???Satisfied?(in the past 1 year)  ??? ??Satisfied?  ??? ? ? ?ADL Screening on??10/07/19.??Satisfied by Anali Nicolas LPN  ??? ? ? ?Blood Pressure Screening on??06/19/20.??Satisfied by Gallito Lincoln  ??? ? ? ?Body Mass Index Check on??06/19/20.??Satisfied by Gallito Lincoln  ??? ? ? ?Breast Cancer Screening on??12/23/19.??Satisfied by Mimi Beltran  ??? ? ? ?Depression Screening on??02/26/20.??Satisfied by Sara Ibarra  ??? ? ? ?Diabetes Maintenance-Urine Dipstick on??06/19/20.??Satisfied by Gallito Lincoln  ??? ? ? ?Diabetes Maintenance-Serum Creatinine on??05/06/20.??Satisfied by Jefferson Graff Jr.  ??? ? ? ?Diabetes Maintenance-Medication Prescribed on??02/26/20.??Satisfied by Sandy Dejesus MD  ??? ? ? ?Diabetes Maintenance-Fasting Lipid Profile on??08/20/19.??Satisfied by Codie De La Torre  ??? ? ? ?Diabetes Maintenance-HgbA1c on??08/20/19.??Satisfied by Codie De La Torre  ??? ? ? ?Diabetes Maintenance-Microalbumin on??08/20/19.??Satisfied by Codie De La Torre  ??? ? ? ?Diabetes Screening on??05/06/20.??Satisfied by Jefferson Graff Jr.  ??? ? ? ?Hypertension Management-Blood Pressure on??06/19/20.??Satisfied by Gallito Lincoln  ??? ? ? ?Hypertension Management-BMP on??05/06/20.??Satisfied by Jefferson Graff Jr.  ??? ? ? ?Influenza Vaccine on??10/07/19.??Satisfied by Anali Nicolas LPN  ??? ? ? ?Lipid Screening on??08/20/19.??Satisfied by Codie De La Torre  ??? ? ? ?Obesity Screening on??06/19/20.??Satisfied by Gallito Lincoln  ??? ??Refused?  ??? ? ? ?Alcohol Misuse Screening on??06/19/20.??Recorded by Gallito Lincoln??Reason: Patient Refuses  ?  Diagnostic Results  Urinalysis????????  Color Ur Dipstick????Yellow????  Appearance Ur Dipstick????Slightly cloudy????  pH Ur Dipstick????7????  Specific Gravity Ur Dipstick????1.020????  Blood Ur Dipstick????Negative????  Glucose Ur Dipstick????Negative????  Ketones Ur  Dipstick????Negative????  Protein Ur Dipstick????Negative????  Bilirubin Ur Dipstick????Negative????  Urobilinogen Ur Dipstick????0.2 mg/dl????  Leukocytes Ur Dipstick????Negative????  Nitrite Ur Dipstick????Negative????  ?

## 2022-06-29 RX ORDER — CALCITRIOL 0.5 UG/1
CAPSULE ORAL
Qty: 30 CAPSULE | Refills: 0 | Status: SHIPPED | OUTPATIENT
Start: 2022-06-29 | End: 2022-07-20 | Stop reason: SDUPTHER

## 2022-07-20 DIAGNOSIS — E83.51 HYPOCALCEMIA: Primary | ICD-10-CM

## 2022-07-20 RX ORDER — CALCITRIOL 0.5 UG/1
0.5 CAPSULE ORAL EVERY MORNING
Qty: 30 CAPSULE | Refills: 0 | Status: SHIPPED | OUTPATIENT
Start: 2022-07-20 | End: 2022-07-22 | Stop reason: SDUPTHER

## 2022-07-22 ENCOUNTER — TELEPHONE (OUTPATIENT)
Dept: ENDOCRINOLOGY | Facility: CLINIC | Age: 57
End: 2022-07-22

## 2022-07-22 ENCOUNTER — OFFICE VISIT (OUTPATIENT)
Dept: ENDOCRINOLOGY | Facility: CLINIC | Age: 57
End: 2022-07-22
Payer: COMMERCIAL

## 2022-07-22 VITALS
SYSTOLIC BLOOD PRESSURE: 128 MMHG | TEMPERATURE: 99 F | BODY MASS INDEX: 26.14 KG/M2 | HEART RATE: 72 BPM | WEIGHT: 153.13 LBS | DIASTOLIC BLOOD PRESSURE: 72 MMHG | RESPIRATION RATE: 16 BRPM | HEIGHT: 64 IN

## 2022-07-22 DIAGNOSIS — E89.0 POSTSURGICAL HYPOTHYROIDISM: ICD-10-CM

## 2022-07-22 DIAGNOSIS — E89.2 POSTSURGICAL HYPOPARATHYROIDISM: Primary | ICD-10-CM

## 2022-07-22 DIAGNOSIS — E83.51 HYPOCALCEMIA: ICD-10-CM

## 2022-07-22 DIAGNOSIS — E89.2 POSTSURGICAL HYPOPARATHYROIDISM: ICD-10-CM

## 2022-07-22 LAB
ALBUMIN SERPL-MCNC: 4.1 GM/DL (ref 3.5–5)
BUN SERPL-MCNC: 12.2 MG/DL (ref 9.8–20.1)
CALCIUM SERPL-MCNC: 8.7 MG/DL (ref 8.4–10.2)
CHLORIDE SERPL-SCNC: 102 MMOL/L (ref 98–107)
CO2 SERPL-SCNC: 28 MMOL/L (ref 22–29)
CREAT SERPL-MCNC: 0.82 MG/DL (ref 0.55–1.02)
GLUCOSE SERPL-MCNC: 181 MG/DL (ref 74–100)
PHOSPHATE SERPL-MCNC: 4.7 MG/DL (ref 2.3–4.7)
POTASSIUM SERPL-SCNC: 3.7 MMOL/L (ref 3.5–5.1)
SODIUM SERPL-SCNC: 142 MMOL/L (ref 136–145)
TSH SERPL-ACNC: 0.5 UIU/ML (ref 0.35–4.94)

## 2022-07-22 PROCEDURE — 99213 OFFICE O/P EST LOW 20 MIN: CPT | Mod: PBBFAC

## 2022-07-22 PROCEDURE — 36415 COLL VENOUS BLD VENIPUNCTURE: CPT

## 2022-07-22 RX ORDER — CALCITRIOL 0.5 UG/1
0.5 CAPSULE ORAL EVERY MORNING
Qty: 90 CAPSULE | Refills: 3 | Status: SHIPPED | OUTPATIENT
Start: 2022-07-22 | End: 2022-11-01 | Stop reason: SDUPTHER

## 2022-07-22 RX ORDER — LEVOTHYROXINE SODIUM 75 UG/1
TABLET ORAL
COMMUNITY
Start: 2022-07-18 | End: 2022-07-22 | Stop reason: SDUPTHER

## 2022-07-22 RX ORDER — METOPROLOL SUCCINATE 25 MG/1
25 TABLET, EXTENDED RELEASE ORAL DAILY
COMMUNITY
Start: 2022-07-18 | End: 2022-11-01 | Stop reason: SDUPTHER

## 2022-07-22 RX ORDER — METFORMIN HYDROCHLORIDE 1000 MG/1
2000 TABLET, FILM COATED, EXTENDED RELEASE ORAL DAILY
COMMUNITY
Start: 2022-07-08 | End: 2023-01-04 | Stop reason: SDUPTHER

## 2022-07-22 RX ORDER — CALCITRIOL 0.25 UG/1
0.25 CAPSULE ORAL DAILY
Qty: 90 CAPSULE | Refills: 3 | Status: SHIPPED | OUTPATIENT
Start: 2022-07-22 | End: 2022-11-01 | Stop reason: SDUPTHER

## 2022-07-22 RX ORDER — LEVOTHYROXINE SODIUM 75 UG/1
75 TABLET ORAL
Qty: 90 TABLET | Refills: 3 | Status: SHIPPED | OUTPATIENT
Start: 2022-07-22 | End: 2022-10-26 | Stop reason: SDUPTHER

## 2022-07-22 RX ORDER — ATORVASTATIN CALCIUM 40 MG/1
40 TABLET, FILM COATED ORAL DAILY
COMMUNITY
Start: 2022-06-21 | End: 2022-07-22 | Stop reason: SDUPTHER

## 2022-07-22 RX ORDER — CALCITRIOL 0.25 UG/1
0.25 CAPSULE ORAL DAILY
COMMUNITY
End: 2022-07-22 | Stop reason: SDUPTHER

## 2022-07-22 RX ORDER — ATORVASTATIN CALCIUM 40 MG/1
40 TABLET, FILM COATED ORAL DAILY
Qty: 90 TABLET | Refills: 1 | Status: SHIPPED | OUTPATIENT
Start: 2022-07-22 | End: 2022-11-01 | Stop reason: SDUPTHER

## 2022-07-22 RX ORDER — AMLODIPINE BESYLATE 10 MG/1
10 TABLET ORAL DAILY
COMMUNITY
Start: 2022-07-02 | End: 2022-09-02 | Stop reason: SDUPTHER

## 2022-07-22 NOTE — PROGRESS NOTES
Endocrinology RESIDENT CLINIC  CLINIC NOTE    Patient Name: Addie Kelley  YOB: 1965    PRESENTING HISTORY       History of Present Illness:  Ms. Addie Kelley is a 56 y.o. female presenting to [facility] clinic for follow up visit. She was last seen in November 2021. Follows us for hypothyroidism, hypoparathryoidism, and low vitamin D secondary to thyroidectomy. Labs have been stable. She presents today and is doing well, no complaints. She's taking her medications. With regards to the Calcium Citrate, she's not sure of the dose of the pill she is getting OTC but takes 3 pills in the morning and then 3 pills after lunch. She denies cramping, heat/cold intolerance, diarrhea/constipation, tremors, hair loss.    ROS  As above    PAST HISTORY:     Past Medical History:   Diagnosis Date    Diabetes mellitus, type 2     Hyperlipidemia     Hypertension     Thyroid disease        Past Surgical History:   Procedure Laterality Date    THYROIDECTOMY Bilateral 2000    TUBAL LIGATION         History reviewed. No pertinent family history.    Social History     Socioeconomic History    Marital status:    Tobacco Use    Smoking status: Never Smoker    Smokeless tobacco: Never Used   Substance and Sexual Activity    Alcohol use: Never    Drug use: Never       MEDICATIONS & ALLERGIES:     Current Outpatient Medications on File Prior to Visit   Medication Sig    amLODIPine (NORVASC) 10 MG tablet Take 10 mg by mouth once daily.    atorvastatin (LIPITOR) 40 MG tablet Take 40 mg by mouth once daily.    calcitRIOL (ROCALTROL) 0.5 MCG Cap Take 1 capsule (0.5 mcg total) by mouth every morning.    GLUMETZA 1,000 mg 24hr tablet Take 2,000 mg by mouth once daily.    levothyroxine (SYNTHROID) 75 MCG tablet Take by mouth.    metoprolol succinate (TOPROL-XL) 25 MG 24 hr tablet Take 25 mg by mouth once daily.    calcitRIOL (ROCALTROL) 0.25 MCG Cap Take 0.25 mcg by mouth once daily. Takes with  "the 0.5mg dailly     No current facility-administered medications on file prior to visit.       Review of patient's allergies indicates:   Allergen Reactions    Clonidine Edema       OBJECTIVE:   Vital Signs:  Vitals:    07/22/22 0930   BP: 128/72   Pulse: 72   Resp: 16   Temp: 98.6 °F (37 °C)   TempSrc: Oral   Weight: 69.4 kg (153 lb 1.8 oz)   Height: 5' 4" (1.626 m)       No results found for this or any previous visit (from the past 24 hour(s)).      Physical Exam  General - Appears comfortable, appropriatley conversive   Mental Status - alert and oriented x 3, speaking in logical, relevant sentences   HEENT - no rhinorrhea   Cardiac - RRR, no murmurs, rubs, or gallops; no edema in LE   Respiratory - breathing comfortably; clear to ascultation bilaterally   Abdominal - nondistended, soft, nontender to palpation   Extremities - LE, UE, and joints are nonerythematous and nonswollen   Skin - no rashes or bruises seen on skin      Laboratory  Lab Results   Component Value Date    WBC 6.8 05/20/2021    HGB 13.8 05/20/2021    HCT 44.0 05/20/2021    MCV 85.0 05/20/2021     05/20/2021     @[id number](GLU,NA,K,Cl,CO2,BUN,Creatinine,Calcium,MG)@  Lab Results   Component Value Date    INR 0.9 05/20/2021    INR 0.94 11/05/2017    PROTIME 12.3 05/20/2021    PROTIME 12.4 11/05/2017     Lab Results   Component Value Date    HGBA1C 6.7 03/03/2022     No results for input(s): POCTGLUCOSE in the last 72 hours.      ASSESSMENT & PLAN:     Post-surgical hypothyroidism  -s/p thyroidectomy 2/2 overactive thyroid per patient  -TFT's stable. Will repeat TSH today. Continue levothyroxine    Post-surgical hypoparathyroidism  -Calcium, phosphorus have been normal. -will check renal function panel  -Continue Calcitriol 0.25 + 0.5mcg/day  -Continue OTC Calcium Citrate at current dose since levels are good. She's taking 3 pills in AM and 3 pills at noon; she thinks the pill is 200mg but isn't sure  -if labs are wnl, then will collect " 24-hour urine calcium and Cr to ensure she is not having hypercalciuria which could increase risk of kidney stones.     RTC in 12 months, sooner if needed    Discussed with Dr. Loaiza  - staff attestation to follow    Dipak Delacruz MD  Internal Medicine PGY-3

## 2022-07-26 NOTE — TELEPHONE ENCOUNTER
Spoke with patient informed labs were with in normals, and ok to proceed wit collection of the 24 hour urine.  Will go by lab and  containers.

## 2022-08-08 NOTE — PROGRESS NOTES
Established Patient - Audio Only Telehealth Visit     The patient location is: Home  The chief complaint leading to consultation is: Clinic follow up  Visit type: Virtual visit with audio only (telephone)  Total time spent with patient: 30       The reason for the audio only service rather than synchronous audio and video virtual visit was related to technical difficulties or patient preference/necessity.     Each patient to whom I provide medical services by telemedicine is:  (1) informed of the relationship between the physician and patient and the respective role of any other health care provider with respect to management of the patient; and (2) notified that they may decline to receive medical services by telemedicine and may withdraw from such care at any time. Patient verbally consented to receive this service via voice-only telephone call.       HPI: Ms. Kelley is a 56 year old female with PMH of hypothyroidism 2/2 thyroidectomy, hypoparathyroidism, HTN, HLD, DM Type II, who is being seen via telehealth today, last appointment seen 4/20/22 via telehealth. She reports she is doing well, no complaints at this time. BP has been well controlled ranging 110-125/70s, blood sugars daily checks between . Denies any fever, chills, headaches, chest pain, palpitations, shortness of breath, abdominal pain, hematochezia, melena, dysuria, unintentional weight loss, weakness, numbness/tingling/loss of sensation in extremities. Recently seen in endocrine clinic, no changes made to medications. Will need in-person appointment in 1-2 months, no refills needed today.     Physical Exam (Limited due to telehealth):  VS: /78 this morning  General: AAOx4 on phone, did not sound as if in any acute distress  Chest: No audible wheezing via phone, did not sound as if short of breath     Assessment and plan:    Hx of Abnormal Uterine Bleeding  -Cystic lesion of right ovary up to 4.2 cm, complex cyst, 3.2 cm calcified  fibroid ultrasound back in 2016  -Seen in GYN clinic, noted that AUB was due to perimenopausal period; no longer having symptoms  -Pap smear NIL HPV negative in 2016  -Referral again placed to GYN clinic for f/u pap-smear     Postsurgical Hypoparathyroidism  Hyperphosphatemia 2/2 Above  -Following in Endocrinology Clinic, last seen 7/22/22  -On Calcitriol 0.50 mcg qAM & Calcitriol 0.25 mcg qPM  -On Calcium Citrate 1900 mg TID  -7/22/22 Calcium of 8/7, phos 4.7     Postsurgical Hypothyroidism  -Continued Levothyroxine 75 mcg  -Latest TSH 0.5 WNL  -Endocrinology following in clinic      Type II Diabetes Mellitus  -Hgb A1c: 6.7 ON 3/3/22- will order repeat for 1 month for biannual schedule  -Urine Protein/Creatinine ratio: normal 2/21; repeat ordered  -Annual Fundus Photo: will repeat at next visit  -DM foot exam: Will perform at next in-person visit  -Annual Lipid Screening: on atorvastatin 80  -BP < 140/90: at goal  -Medication: metformin 1000 mg BID      HLD  -Lipid panel (3/3/22): Chol 231, HDL 70, Tri 73,   -Since increased from 40->80 mg daily  -Repeat ordered prior to next visit      Primary HTN  -Pt states home BP's usually 110-120/60/70s  -Continue amlodipine 10 mg daily and metoprolol succ 25 mg daily         Health Maintenance  -Cervical Cancer Screening: Pap smear NIL HPV negative in 2016, referral placed today to GYN for repeat   -Breast Cancer Screening: UTD [facility]-1 negative 1/11/22; repeat mid-Jan 2023  -Colon Cancer Screening: Colonoscopy done at outside facility, found to be negative- results requested  -Immunizations:    -TDap: UTD    -Pneumonia: UTD    -Covid: UTD    -Shingles: needs Shingrex, will perform 1 of 2 at next visit  -HCV lifetime screening: negative  -HIV lifetime screening: negative       RTC in 1-2 months with A1c, lipid panel, microalbumin for in-person appointment    Bon Zapien MD  [employer] IM PGY II           This service was not originating from a related E/M  service provided within the previous 7 days nor will  to an E/M service or procedure within the next 24 hours or my soonest available appointment.  Prevailing standard of care was able to be met in this audio-only visit.

## 2022-08-09 ENCOUNTER — CLINICAL SUPPORT (OUTPATIENT)
Dept: INTERNAL MEDICINE | Facility: CLINIC | Age: 57
End: 2022-08-09
Payer: COMMERCIAL

## 2022-08-09 DIAGNOSIS — E11.9 TYPE 2 DIABETES MELLITUS WITHOUT COMPLICATION, WITHOUT LONG-TERM CURRENT USE OF INSULIN: ICD-10-CM

## 2022-08-09 DIAGNOSIS — E89.0 POSTSURGICAL HYPOTHYROIDISM: ICD-10-CM

## 2022-08-09 DIAGNOSIS — I10 PRIMARY HYPERTENSION: Primary | ICD-10-CM

## 2022-08-09 DIAGNOSIS — E89.2 POSTSURGICAL HYPOPARATHYROIDISM: ICD-10-CM

## 2022-08-09 DIAGNOSIS — Z12.4 CERVICAL CANCER SCREENING: ICD-10-CM

## 2022-09-03 RX ORDER — AMLODIPINE BESYLATE 10 MG/1
10 TABLET ORAL DAILY
Qty: 30 TABLET | Refills: 6 | Status: SHIPPED | OUTPATIENT
Start: 2022-09-03 | End: 2022-11-01 | Stop reason: SDUPTHER

## 2022-10-04 ENCOUNTER — TELEPHONE (OUTPATIENT)
Dept: INTERNAL MEDICINE | Facility: CLINIC | Age: 57
End: 2022-10-04
Payer: COMMERCIAL

## 2022-10-04 DIAGNOSIS — E20.9 HYPOPARATHYROIDISM, UNSPECIFIED HYPOPARATHYROIDISM TYPE: Primary | ICD-10-CM

## 2022-10-04 NOTE — TELEPHONE ENCOUNTER
Hi,    I saw Ms. Kelley in Endocrinology clinic in July. I received a message that her 24-hour urine calcium and creatinine orders were canceled because they were never turned in to the lab. I will re-enter the orders. Please call the patient and remind her to submit the urine samples to the labs. Thank you    -Jarvis Delacruz PGY 3

## 2022-10-05 NOTE — TELEPHONE ENCOUNTER
Attempted to reach, no answer, unable to leave voicemail due to mailbox full. Will mail letter along with lab orders.

## 2022-10-13 ENCOUNTER — APPOINTMENT (OUTPATIENT)
Dept: LAB | Facility: HOSPITAL | Age: 57
End: 2022-10-13
Attending: STUDENT IN AN ORGANIZED HEALTH CARE EDUCATION/TRAINING PROGRAM
Payer: COMMERCIAL

## 2022-10-13 ENCOUNTER — TELEPHONE (OUTPATIENT)
Dept: ENDOCRINOLOGY | Facility: CLINIC | Age: 57
End: 2022-10-13
Payer: COMMERCIAL

## 2022-10-13 LAB
CALCIUM 24H UR-MCNC: 396 MG/DAY (ref 100–300)
CALCIUM UR-MCNC: 16.5 MG/DL
CREAT 24H UR-MCNC: 1048.8 MG/DAY (ref 950–2490)
CREAT UR-MCNC: 43.7 MG/DL (ref 47–110)
TOTAL VOLUME  (OHS): 2400 ML
TOTAL VOLUME  (OHS): 2400 ML

## 2022-10-26 DIAGNOSIS — E89.2 POSTSURGICAL HYPOPARATHYROIDISM: Primary | ICD-10-CM

## 2022-10-26 RX ORDER — LEVOTHYROXINE SODIUM 75 UG/1
75 TABLET ORAL
Qty: 90 TABLET | Refills: 3 | Status: SHIPPED | OUTPATIENT
Start: 2022-10-26 | End: 2022-11-01 | Stop reason: SDUPTHER

## 2022-10-31 ENCOUNTER — LAB VISIT (OUTPATIENT)
Dept: LAB | Facility: HOSPITAL | Age: 57
End: 2022-10-31
Attending: STUDENT IN AN ORGANIZED HEALTH CARE EDUCATION/TRAINING PROGRAM
Payer: COMMERCIAL

## 2022-10-31 DIAGNOSIS — E11.9 TYPE 2 DIABETES MELLITUS WITHOUT COMPLICATION, WITHOUT LONG-TERM CURRENT USE OF INSULIN: ICD-10-CM

## 2022-10-31 DIAGNOSIS — I10 PRIMARY HYPERTENSION: ICD-10-CM

## 2022-10-31 LAB
BASOPHILS # BLD AUTO: 0.03 X10(3)/MCL (ref 0–0.2)
BASOPHILS NFR BLD AUTO: 0.5 %
CHOLEST SERPL-MCNC: 200 MG/DL
CHOLEST/HDLC SERPL: 3 {RATIO} (ref 0–5)
EOSINOPHIL # BLD AUTO: 0.03 X10(3)/MCL (ref 0–0.9)
EOSINOPHIL NFR BLD AUTO: 0.5 %
ERYTHROCYTE [DISTWIDTH] IN BLOOD BY AUTOMATED COUNT: 13 % (ref 11.5–17)
EST. AVERAGE GLUCOSE BLD GHB EST-MCNC: 142.7 MG/DL
HBA1C MFR BLD: 6.6 %
HCT VFR BLD AUTO: 39.9 % (ref 37–47)
HDLC SERPL-MCNC: 62 MG/DL (ref 35–60)
HGB BLD-MCNC: 13 GM/DL (ref 12–16)
IMM GRANULOCYTES # BLD AUTO: 0.01 X10(3)/MCL (ref 0–0.04)
IMM GRANULOCYTES NFR BLD AUTO: 0.2 %
LDLC SERPL CALC-MCNC: 125 MG/DL (ref 50–140)
LYMPHOCYTES # BLD AUTO: 2.24 X10(3)/MCL (ref 0.6–4.6)
LYMPHOCYTES NFR BLD AUTO: 39.9 %
MCH RBC QN AUTO: 28.9 PG (ref 27–31)
MCHC RBC AUTO-ENTMCNC: 32.6 MG/DL (ref 33–36)
MCV RBC AUTO: 88.7 FL (ref 80–94)
MONOCYTES # BLD AUTO: 0.34 X10(3)/MCL (ref 0.1–1.3)
MONOCYTES NFR BLD AUTO: 6 %
NEUTROPHILS # BLD AUTO: 3 X10(3)/MCL (ref 2.1–9.2)
NEUTROPHILS NFR BLD AUTO: 52.9 %
NRBC BLD AUTO-RTO: 0 %
PLATELET # BLD AUTO: 256 X10(3)/MCL (ref 130–400)
PMV BLD AUTO: 11.3 FL (ref 7.4–10.4)
RBC # BLD AUTO: 4.5 X10(6)/MCL (ref 4.2–5.4)
TRIGL SERPL-MCNC: 63 MG/DL (ref 37–140)
VLDLC SERPL CALC-MCNC: 13 MG/DL
WBC # SPEC AUTO: 5.6 X10(3)/MCL (ref 4.5–11.5)

## 2022-10-31 PROCEDURE — 80061 LIPID PANEL: CPT

## 2022-10-31 PROCEDURE — 85025 COMPLETE CBC W/AUTO DIFF WBC: CPT

## 2022-10-31 PROCEDURE — 36415 COLL VENOUS BLD VENIPUNCTURE: CPT

## 2022-10-31 PROCEDURE — 83036 HEMOGLOBIN GLYCOSYLATED A1C: CPT

## 2022-10-31 PROCEDURE — 82570 ASSAY OF URINE CREATININE: CPT

## 2022-11-01 ENCOUNTER — CLINICAL SUPPORT (OUTPATIENT)
Dept: INTERNAL MEDICINE | Facility: CLINIC | Age: 57
End: 2022-11-01
Attending: STUDENT IN AN ORGANIZED HEALTH CARE EDUCATION/TRAINING PROGRAM
Payer: COMMERCIAL

## 2022-11-01 ENCOUNTER — OFFICE VISIT (OUTPATIENT)
Dept: INTERNAL MEDICINE | Facility: CLINIC | Age: 57
End: 2022-11-01
Payer: COMMERCIAL

## 2022-11-01 VITALS
TEMPERATURE: 98 F | SYSTOLIC BLOOD PRESSURE: 134 MMHG | HEIGHT: 64 IN | RESPIRATION RATE: 18 BRPM | BODY MASS INDEX: 26.15 KG/M2 | WEIGHT: 153.19 LBS | HEART RATE: 67 BPM | DIASTOLIC BLOOD PRESSURE: 66 MMHG

## 2022-11-01 DIAGNOSIS — E89.0 POSTSURGICAL HYPOTHYROIDISM: ICD-10-CM

## 2022-11-01 DIAGNOSIS — I10 PRIMARY HYPERTENSION: ICD-10-CM

## 2022-11-01 DIAGNOSIS — D50.9 IRON DEFICIENCY ANEMIA, UNSPECIFIED IRON DEFICIENCY ANEMIA TYPE: ICD-10-CM

## 2022-11-01 DIAGNOSIS — E11.9 TYPE 2 DIABETES MELLITUS WITHOUT COMPLICATION, WITHOUT LONG-TERM CURRENT USE OF INSULIN: ICD-10-CM

## 2022-11-01 DIAGNOSIS — Z23 NEED FOR SHINGLES VACCINE: ICD-10-CM

## 2022-11-01 DIAGNOSIS — B07.0 PLANTAR WARTS: ICD-10-CM

## 2022-11-01 DIAGNOSIS — E55.9 VITAMIN D DEFICIENCY: ICD-10-CM

## 2022-11-01 DIAGNOSIS — E83.51 HYPOCALCEMIA: ICD-10-CM

## 2022-11-01 DIAGNOSIS — E89.2 POSTSURGICAL HYPOPARATHYROIDISM: ICD-10-CM

## 2022-11-01 DIAGNOSIS — E78.2 MIXED HYPERLIPIDEMIA: ICD-10-CM

## 2022-11-01 DIAGNOSIS — Z23 NEED FOR INFLUENZA VACCINATION: Primary | ICD-10-CM

## 2022-11-01 PROCEDURE — 99214 OFFICE O/P EST MOD 30 MIN: CPT | Mod: PBBFAC,25

## 2022-11-01 PROCEDURE — 92228 IMG RTA DETC/MNTR DS PHY/QHP: CPT | Mod: PBBFAC

## 2022-11-01 PROCEDURE — 90471 IMMUNIZATION ADMIN: CPT | Mod: PBBFAC

## 2022-11-01 RX ORDER — CALCITRIOL 0.25 UG/1
0.25 CAPSULE ORAL DAILY
Qty: 90 CAPSULE | Refills: 3 | Status: SHIPPED | OUTPATIENT
Start: 2022-11-01 | End: 2023-06-13 | Stop reason: SDUPTHER

## 2022-11-01 RX ORDER — METOPROLOL SUCCINATE 25 MG/1
25 TABLET, EXTENDED RELEASE ORAL DAILY
Qty: 90 TABLET | Refills: 3 | Status: SHIPPED | OUTPATIENT
Start: 2022-11-01 | End: 2023-05-17 | Stop reason: SDUPTHER

## 2022-11-01 RX ORDER — AMLODIPINE BESYLATE 10 MG/1
10 TABLET ORAL DAILY
Qty: 90 TABLET | Refills: 3 | Status: SHIPPED | OUTPATIENT
Start: 2022-11-01 | End: 2023-05-17 | Stop reason: SDUPTHER

## 2022-11-01 RX ORDER — LEVOTHYROXINE SODIUM 75 UG/1
75 TABLET ORAL
Qty: 90 TABLET | Refills: 3 | Status: SHIPPED | OUTPATIENT
Start: 2022-11-01 | End: 2023-06-13 | Stop reason: SDUPTHER

## 2022-11-01 RX ORDER — ATORVASTATIN CALCIUM 80 MG/1
80 TABLET, FILM COATED ORAL DAILY
Qty: 90 TABLET | Refills: 3 | Status: SHIPPED | OUTPATIENT
Start: 2022-11-01 | End: 2023-05-17 | Stop reason: SDUPTHER

## 2022-11-01 RX ORDER — CALCITRIOL 0.5 UG/1
0.5 CAPSULE ORAL EVERY MORNING
Qty: 90 CAPSULE | Refills: 3 | Status: SHIPPED | OUTPATIENT
Start: 2022-11-01 | End: 2023-06-13 | Stop reason: SDUPTHER

## 2022-11-01 NOTE — PROGRESS NOTES
"[facility] Visit    Chief Complaint:      Follow-up (Denies any concerns at this time)     Subjective:     HPI:  Ms. Kelley is a 57 year old female with PMH of hypothyroidism 2/2 thyroidectomy, hypoparathyroidism, HTN, HLD, DM Type II here for clinic f/u, last seen via telehealth 8/9/22. She reports no complaints today other than requesting medication refills. Upon physical exam she does have a cluster of plantar warts to 2nd toe medial aspect which she states has been there "for a while" and has overgrown toenails that she would like to have podiatry referral for.     Review of Systems  Constitutional: no fever, fatigue, weakness  Eye: no vision loss, eye redness, drainage, or pain  ENMT: no sore throat, ear pain, sinus pain/congestion, nasal congestion/drainage  Respiratory: no cough, no wheezing, no shortness of breath  Cardiovascular: no chest pain, no palpitations, no edema  Gastrointestinal: no nausea, vomiting, or diarrhea. No abdominal pain  Genitourinary: no dysuria, no urinary frequency or urgency, no hematuria  Hema/Lymph: no abnormal bruising or bleeding  Endocrine: no heat or cold intolerance, no excessive thirst or excessive urination  Musculoskeletal: no muscle or joint pain, no joint swelling  Integumentary: + cluster of plantar warts to R foot 2nd toe medial aspect, overgrown toenails   Neurologic: no headache, no dizziness, no weakness or numbness    Objective:   Last 24 Hour Vital Signs:  Vitals  BP: 134/66  Temp: 98.2 °F (36.8 °C)  Temp src: Oral  Pulse: 67  Resp: 18  Height: 5' 4" (162.6 cm)  Weight: 69.5 kg (153 lb 3.2 oz)    Physical Examination:    General: Well nourished, well developed in NAD  HEENT: PERRLA, NC/AT, MMM  Respiratory: CTAB, normal respiratory effort  Cardiovascular: RRR, no m/r/g  Gastrointestinal: S, NT, ND, active BS, no masses palpable  Musculoskeletal: full range of motion of all extremities/spine without limitation or discomfort  Integumentary: " cluster of plantar warts to 2nd toe medial aspect, multiple overgrown toenails b/l feet  Neurologic: AAOx4, CN II-XII grossly intact, normal gait  Diabetic foot exam: intact pulses bilaterally, no calluses, no ulcers, intact sensations bilaterally, intact DTRs. Normal monofilament test.       Labs  BMP:   Lab Results   Component Value Date    CHLORIDE 102 07/22/2022    CO2 28 07/22/2022    BUN 12.2 07/22/2022    CREATININE 0.82 07/22/2022    GLUCOSE 181 (H) 07/22/2022    CALCIUM 8.7 07/22/2022     CBC:   Lab Results   Component Value Date    WBC 5.6 10/31/2022    HGB 13.0 10/31/2022    HCT 39.9 10/31/2022    MCV 88.7 10/31/2022    RDW 13.0 10/31/2022     LFTs:   Lab Results   Component Value Date    LABPROT 8.8 (H) 06/29/2021    ALBUMIN 4.1 07/22/2022    AST 18 06/29/2021    ALT 17 06/29/2021    ALKPHOS 71 06/29/2021     FLP:   Lab Results   Component Value Date    CHOL 200 10/31/2022    HDL 62 (H) 10/31/2022    .00 10/31/2022    TRIG 63 10/31/2022    TOTALCHOLEST 3 10/31/2022     DM:   Lab Results   Component Value Date    HGBA1C 6.6 10/31/2022    .7 10/31/2022    CREATININE 0.82 07/22/2022    CREATRANDUR 61.8 10/31/2022    PROTEINURINE <6.8 10/31/2022     Thyroid:   Lab Results   Component Value Date    TSH 0.5000 07/22/2022    IOKRMY1BHWS 1.12 06/29/2021     Anemia: No results found for: IRON, TIBC, FERRITIN, [id number], FOLATE          Assessment & Plan:     Hx of Abnormal Uterine Bleeding  -Cystic lesion of right ovary up to 4.2 cm, complex cyst, 3.2 cm calcified fibroid ultrasound back in 2016  -Seen in GYN clinic, noted that AUB was due to perimenopausal period; no longer having symptoms  -Pap smear NIL HPV negative in 2016  -Referral again placed to GYN clinic for f/u pap-smear-> appt in Aug 2023     Postsurgical Hypoparathyroidism  Hyperphosphatemia 2/2 Above  -Following in Endocrinology Clinic, last seen 7/22/22  -On Calcitriol 0.50 mcg qAM & Calcitriol 0.25 mcg qPM  -On Calcium Citrate 1900  mg TID  -7/22/22 Calcium of 8/7, phos 4.7     Postsurgical Hypothyroidism  -Continued Levothyroxine 75 mcg  -Latest TSH 0.5 WNL on 7/22/22  -Endocrinology following in clinic      Type II Diabetes Mellitus  -Hgb A1c: 6.6 ON 10/31/22 (previous was 6.7)  -Urine Protein/Creatinine ratio: normal 10/31/22  -Annual Fundus Photo: ordered today  -DM foot exam: UTD 11/1/22- WNL, see documentation in PE  -Annual Lipid Screening: on atorvastatin 80  -BP < 140/90: at goal  -Medication: Continue metformin 1000 mg BID      HLD  -Lipid panel (10/31/22): Chol 200, HDL 62, Tri 63,   -Had increased atorvastatin to 80 mg daily (from 40)      Primary HTN  -Pt states home BP's usually 110-120/60/70s  -Continue amlodipine 10 mg daily and metoprolol succ 25 mg daily    Plantar Warts  Overgrown Toenails  -Cluster of plantar warts to right 2nd toe  -Multiple thick, overgrown toenails  -Refer to [facility] pod[facility]ry       Health Maintenance  -Cervical Cancer Screening: Pap smear NIL HPV negative in 2016, referral placed today to GYN for repeat   -Breast Cancer Screening: UTD MMG/Juan Pablo Birads-1 negative 1/11/22; repeat mid-Jan 2023  -Colon Cancer Screening: Colonoscopy 11/20 done at outside facility, found to be negative; repeat in 10 years in 2030  -Immunizations:    -TDap: UTD    -Pneumonia: UTD    -Covid: UTD    -Shingles: Shingrex 1 of 2 today  -HCV lifetime screening: negative  -HIV lifetime screening: negative        To be addressed at next follow-up  -Add lisinopril for renoprotection? Possibly stop metoprolol  -2nd dose Shingrex       Follow-ups  -Follow-up medicine clinic in 6 months      Bon Zapien MD  [employer] [facility] PGY II

## 2022-11-02 NOTE — PROGRESS NOTES
Addie Kelley is a 57 y.o. female here for a diabetic eye screening with non-dilated fundus photos per Dr. Bon Zapien.    Patient cooperative?: Yes  Small pupils?: No  Last eye exam: unknown    For exam results, see Encounter Report.

## 2023-01-04 RX ORDER — METFORMIN HYDROCHLORIDE 1000 MG/1
2000 TABLET, FILM COATED, EXTENDED RELEASE ORAL DAILY
Qty: 60 TABLET | Refills: 3 | Status: SHIPPED | OUTPATIENT
Start: 2023-01-04 | End: 2023-01-06 | Stop reason: SDUPTHER

## 2023-01-04 NOTE — TELEPHONE ENCOUNTER
----- Message from Allybuddy Huffman sent at 1/4/2023  8:21 AM CST -----  Regarding: Dr. Zapien  Pt request medication refill on medication:  1.GLUMETZA 1,000 mg 24hr tablet.    To be sent to [facility] on [address].  Pt states that she is completely out.  Please Advise.    Thanks

## 2023-01-04 NOTE — TELEPHONE ENCOUNTER
Rx sent to pharmacy of choice [facility] on [address]. Please let pt know.    Thanks,    Bon Zapien MD  [employer] [facility] PGY II

## 2023-01-05 ENCOUNTER — TELEPHONE (OUTPATIENT)
Dept: INTERNAL MEDICINE | Facility: CLINIC | Age: 58
End: 2023-01-05
Payer: COMMERCIAL

## 2023-01-05 DIAGNOSIS — E11.9 TYPE 2 DIABETES MELLITUS WITHOUT COMPLICATION, WITHOUT LONG-TERM CURRENT USE OF INSULIN: Primary | ICD-10-CM

## 2023-01-05 NOTE — TELEPHONE ENCOUNTER
Dr Elba Hernandez,     Spoke with pt and pharmacist concerning Glumetza. Pharmacist states that wrong med has been ordered. She states that you need to order Glumetza 1,000 mg ER tablets.    If you need to speak with them, you can contact them @ [phone].

## 2023-01-06 RX ORDER — METFORMIN HYDROCHLORIDE 1000 MG/1
2000 TABLET, FILM COATED, EXTENDED RELEASE ORAL
Qty: 180 TABLET | Refills: 3 | Status: SHIPPED | OUTPATIENT
Start: 2023-01-06 | End: 2023-01-11 | Stop reason: DRUGHIGH

## 2023-01-06 NOTE — TELEPHONE ENCOUNTER
Order switched to Glumetza ER per pharmacy instruction.    Thank you,    Bon Zapien MD  [employer] [facility] PGY II

## 2023-01-09 ENCOUNTER — TELEPHONE (OUTPATIENT)
Dept: INTERNAL MEDICINE | Facility: CLINIC | Age: 58
End: 2023-01-09
Payer: COMMERCIAL

## 2023-01-09 NOTE — TELEPHONE ENCOUNTER
----- Message from Avelina Gross sent at 1/9/2023 11:43 AM CST -----  Regarding: medicine/rishabh  Pt medication metformin has a pa on it at the pharmacy and she needs it done asap because she's been off of It a week.

## 2023-01-09 NOTE — TELEPHONE ENCOUNTER
I do not have any pending PA's for this medication, is there a way for it to get sent to me?    Bon Zapien MD  [employer] [facility] PGY II

## 2023-01-11 ENCOUNTER — TELEPHONE (OUTPATIENT)
Dept: INTERNAL MEDICINE | Facility: CLINIC | Age: 58
End: 2023-01-11

## 2023-01-11 DIAGNOSIS — E11.9 TYPE 2 DIABETES MELLITUS WITHOUT COMPLICATION, WITHOUT LONG-TERM CURRENT USE OF INSULIN: Primary | ICD-10-CM

## 2023-01-11 RX ORDER — METFORMIN HYDROCHLORIDE 500 MG/1
1000 TABLET, FILM COATED, EXTENDED RELEASE ORAL 2 TIMES DAILY WITH MEALS
Qty: 120 TABLET | Refills: 11 | Status: SHIPPED | OUTPATIENT
Start: 2023-01-11 | End: 2023-05-17 | Stop reason: SDUPTHER

## 2023-01-11 NOTE — TELEPHONE ENCOUNTER
New Rx sent for requested dose. Metformin 500 mg x2 tabs BIDWM.    Please let pt know.    Thanks,    Bon

## 2023-01-11 NOTE — TELEPHONE ENCOUNTER
Sirena at [facility] called stating patient metformin 1000 XR is not covered by pt insurance and requesting a med change to 2 tablets of Metfomin 500 XR. Please Advise and send a new Rx -  Fax: [phone]

## 2023-03-29 ENCOUNTER — HOSPITAL ENCOUNTER (OUTPATIENT)
Dept: RADIOLOGY | Facility: HOSPITAL | Age: 58
Discharge: HOME | End: 2023-03-29
Attending: STUDENT IN AN ORGANIZED HEALTH CARE EDUCATION/TRAINING PROGRAM
Payer: COMMERCIAL

## 2023-03-29 DIAGNOSIS — Z12.31 BREAST CANCER SCREENING BY MAMMOGRAM: ICD-10-CM

## 2023-03-29 PROCEDURE — 77067 MAMMO DIGITAL SCREENING BILAT WITH TOMO: ICD-10-PCS | Mod: 26,,, | Performed by: RADIOLOGY

## 2023-03-29 PROCEDURE — 77067 SCR MAMMO BI INCL CAD: CPT | Mod: 26,,, | Performed by: RADIOLOGY

## 2023-03-29 PROCEDURE — 77067 SCR MAMMO BI INCL CAD: CPT | Mod: TC

## 2023-03-29 PROCEDURE — 77063 MAMMO DIGITAL SCREENING BILAT WITH TOMO: ICD-10-PCS | Mod: 26,,, | Performed by: RADIOLOGY

## 2023-03-29 PROCEDURE — 77063 BREAST TOMOSYNTHESIS BI: CPT | Mod: 26,,, | Performed by: RADIOLOGY

## 2023-04-04 ENCOUNTER — DOCUMENTATION ONLY (OUTPATIENT)
Dept: ADMINISTRATIVE | Facility: HOSPITAL | Age: 58
End: 2023-04-04
Payer: COMMERCIAL

## 2023-04-12 LAB
CREAT UR-MCNC: 61.8 MG/DL (ref 47–110)
PROT UR STRIP-MCNC: <6.8 MG/DL

## 2023-05-17 ENCOUNTER — OFFICE VISIT (OUTPATIENT)
Dept: INTERNAL MEDICINE | Facility: CLINIC | Age: 58
End: 2023-05-17
Payer: COMMERCIAL

## 2023-05-17 VITALS
RESPIRATION RATE: 20 BRPM | TEMPERATURE: 98 F | DIASTOLIC BLOOD PRESSURE: 74 MMHG | WEIGHT: 138.88 LBS | SYSTOLIC BLOOD PRESSURE: 158 MMHG | BODY MASS INDEX: 23.71 KG/M2 | HEIGHT: 64 IN | HEART RATE: 83 BPM | OXYGEN SATURATION: 98 %

## 2023-05-17 DIAGNOSIS — E55.9 VITAMIN D DEFICIENCY: ICD-10-CM

## 2023-05-17 DIAGNOSIS — I10 PRIMARY HYPERTENSION: ICD-10-CM

## 2023-05-17 DIAGNOSIS — Z23 NEED FOR PNEUMOCOCCAL VACCINATION: Primary | ICD-10-CM

## 2023-05-17 DIAGNOSIS — E89.2 POSTSURGICAL HYPOPARATHYROIDISM: ICD-10-CM

## 2023-05-17 DIAGNOSIS — E78.2 MIXED HYPERLIPIDEMIA: ICD-10-CM

## 2023-05-17 DIAGNOSIS — E89.0 POSTSURGICAL HYPOTHYROIDISM: ICD-10-CM

## 2023-05-17 DIAGNOSIS — E11.9 TYPE 2 DIABETES MELLITUS WITHOUT COMPLICATION, WITHOUT LONG-TERM CURRENT USE OF INSULIN: ICD-10-CM

## 2023-05-17 DIAGNOSIS — E83.51 HYPOCALCEMIA: ICD-10-CM

## 2023-05-17 DIAGNOSIS — Z23 NEED FOR ZOSTER VACCINATION: ICD-10-CM

## 2023-05-17 PROCEDURE — 90677 PCV20 VACCINE IM: CPT | Mod: PBBFAC

## 2023-05-17 PROCEDURE — 90472 IMMUNIZATION ADMIN EACH ADD: CPT | Mod: PBBFAC

## 2023-05-17 PROCEDURE — 99214 OFFICE O/P EST MOD 30 MIN: CPT | Mod: PBBFAC,25

## 2023-05-17 RX ORDER — METFORMIN HYDROCHLORIDE 500 MG/1
1000 TABLET, FILM COATED, EXTENDED RELEASE ORAL 2 TIMES DAILY WITH MEALS
Qty: 120 TABLET | Refills: 11 | Status: SHIPPED | OUTPATIENT
Start: 2023-05-17 | End: 2023-08-08 | Stop reason: SDUPTHER

## 2023-05-17 RX ORDER — AMLODIPINE BESYLATE 10 MG/1
10 TABLET ORAL DAILY
Qty: 90 TABLET | Refills: 3 | Status: SHIPPED | OUTPATIENT
Start: 2023-05-17 | End: 2023-08-08 | Stop reason: SDUPTHER

## 2023-05-17 RX ORDER — ATORVASTATIN CALCIUM 80 MG/1
80 TABLET, FILM COATED ORAL DAILY
Qty: 90 TABLET | Refills: 3 | Status: SHIPPED | OUTPATIENT
Start: 2023-05-17 | End: 2023-07-17 | Stop reason: SDUPTHER

## 2023-05-17 RX ORDER — METOPROLOL SUCCINATE 25 MG/1
25 TABLET, EXTENDED RELEASE ORAL DAILY
Qty: 90 TABLET | Refills: 3 | Status: SHIPPED | OUTPATIENT
Start: 2023-05-17 | End: 2023-08-08 | Stop reason: SDUPTHER

## 2023-05-17 NOTE — PROGRESS NOTES
"[facility] Visit    Chief Complaint:      Follow-up (Pt here today for f/u visit. Pt compliant of feeling as though something is in her ear. Denies hearing loss are pain. )     Subjective:     HPI:  Ms. Kelley is a 57 year old female with PMH of hypothyroidism 2/2 thyroidectomy, hypoparathyroidism, HTN, HLD, DM Type II here for clinic f/u, 11/1/22. Her only complaint today is sensation of something in her left ear for 2 weeks, no pain or hearing loss however. Denies any other complaints, requesting refills and vaccinations today.     Review of Systems  Constitutional: no fever, fatigue, weakness  Eye: no vision loss, eye redness, drainage, or pain  ENMT: no sore throat, ear pain, hearing loss; +sensation of "something in ear," no sinus pain/congestion, nasal congestion/drainage  Respiratory: no cough, no wheezing, no shortness of breath  Cardiovascular: no chest pain, no palpitations, no edema  Gastrointestinal: no nausea, vomiting, or diarrhea. No abdominal pain  Genitourinary: no dysuria, no urinary frequency or urgency, no hematuria  Hema/Lymph: no abnormal bruising or bleeding  Endocrine: no heat or cold intolerance, no excessive thirst or excessive urination  Musculoskeletal: no muscle or joint pain, no joint swelling  Integumentary: no rashes, lesions  Neurologic: no headache, no dizziness, no weakness or numbness    Objective:   Last 24 Hour Vital Signs:  Vitals  BP: (!) 158/74  Temp: 97.7 °F (36.5 °C)  Temp Source: Oral  Pulse: 83  Resp: 20  SpO2: 98 %  Height: 5' 4" (162.6 cm)  Weight: 63 kg (138 lb 14.2 oz)    Physical Examination:    General: Well nourished, well developed in NAD  HEENT: PERRLA, NC/AT, MMM  Respiratory: CTAB, normal respiratory effort  Cardiovascular: RRR, no m/r/g  Gastrointestinal: S, NT, ND, active BS, no masses palpable  Musculoskeletal: full range of motion of all extremities/spine without limitation or discomfort  Integumentary: cluster of plantar warts to 2nd " toe medial aspect, multiple overgrown toenails b/l feet  Neurologic: AAOx4, CN II-XII grossly intact, normal gait  Diabetic foot exam: intact pulses bilaterally, no calluses, no ulcers, intact sensations bilaterally, intact DTRs. Normal monofilament test.       Labs  BMP:   Lab Results   Component Value Date    CHLORIDE 102 07/22/2022    CO2 28 07/22/2022    BUN 12.2 07/22/2022    CREATININE 0.82 07/22/2022    GLUCOSE 181 (H) 07/22/2022    CALCIUM 8.7 07/22/2022     CBC:   Lab Results   Component Value Date    WBC 5.6 10/31/2022    HGB 13.0 10/31/2022    HCT 39.9 10/31/2022    MCV 88.7 10/31/2022    RDW 13.0 10/31/2022     LFTs:   Lab Results   Component Value Date    LABPROT 8.8 (H) 06/29/2021    ALBUMIN 4.1 07/22/2022    AST 18 06/29/2021    ALT 17 06/29/2021    ALKPHOS 71 06/29/2021     FLP:   Lab Results   Component Value Date    CHOL 200 10/31/2022    HDL 62 (H) 10/31/2022    .00 10/31/2022    TRIG 63 10/31/2022    TOTALCHOLEST 3 10/31/2022     DM:   Lab Results   Component Value Date    HGBA1C 6.6 10/31/2022    .7 10/31/2022    CREATININE 0.82 07/22/2022    CREATRANDUR 61.8 10/31/2022    PROTEINURINE <6.8 10/31/2022     Thyroid:   Lab Results   Component Value Date    TSH 0.5000 07/22/2022    SFTOPJ1SPPO 1.12 06/29/2021     Anemia: No results found for: IRON, TIBC, FERRITIN, [id number], FOLATE          Assessment & Plan:     Hx of Abnormal Uterine Bleeding  -Cystic lesion of right ovary up to 4.2 cm, complex cyst, 3.2 cm calcified fibroid ultrasound back in 2016  -Seen in GYN clinic, noted that AUB was due to perimenopausal period; no longer having symptoms  -Pap smear NIL HPV negative in 2016  -GYN appt in Aug 2023     Postsurgical Hypoparathyroidism  Hyperphosphatemia 2/2 Above  -Following in Endocrinology Clinic, last seen 7/22/22  -On Calcitriol 0.50 mcg qAM & Calcitriol 0.25 mcg qPM  -On Calcium Citrate 1900 mg TID  -7/22/22 Calcium of 8/7, phos 4.7  -Repeat CMP today     Postsurgical  Hypothyroidism  -Denies any symptoms of fatigue, cold intolerance, constipation  -Continued Levothyroxine 75 mcg  -Latest TSH 0.5 WNL on 7/22/22  -Endocrinology following in clinic      Type II Diabetes Mellitus  -Hgb A1c: 6.6 ON 10/31/22 (previous was 6.7); repeat ordered today  -Urine Protein/Creatinine ratio: repeat ordered  -Annual Fundus Photo: [facility]  -DM foot exam: UTD 11/1/22- WNL, see documentation in PE  -Annual Lipid Screening: on atorvastatin 80  -BP < 140/90: at goal  -Medication: Continue metformin 1000 mg BID      HLD  -Lipid panel (10/31/22): Chol 200, HDL 62, Tri 63,   -Had increased atorvastatin to 80 mg daily (from 40), however pt reports noncompliance with medication- has agreed to start taking regularly  -Will obtain annually      Primary HTN  -BP above goal 158/74  -Does take BP at home, states home BP's usually 110-130/60/70s  -Continue amlodipine 10 mg daily and metoprolol succ 25 mg daily        Health Maintenance  -Cervical Cancer Screening: Pap smear NIL HPV negative in 2016, referral placed today to GYN for repeat   -Breast Cancer Screening: UTD MMG/Juan Pablo Birads-1 negative 3/29/23; repeat in 1 year  -Colon Cancer Screening: Colonoscopy 11/20 done at outside facility, found to be negative; repeat in 10 years in 2030  -Immunizations:    -TDap: UTD    -Pneumonia: Prevnar 20 today 5/17/23    -Covid: UTD    -Shingles: Shingrex 2 of 2 today  -HCV lifetime screening: negative  -HIV lifetime screening: negative        Follow-ups  -Follow-up medicine clinic in 4 months with labs      Bon Zapien MD  [employer] IM PGY II

## 2023-05-23 ENCOUNTER — LAB VISIT (OUTPATIENT)
Dept: LAB | Facility: HOSPITAL | Age: 58
End: 2023-05-23
Attending: STUDENT IN AN ORGANIZED HEALTH CARE EDUCATION/TRAINING PROGRAM
Payer: COMMERCIAL

## 2023-05-23 DIAGNOSIS — E11.9 TYPE 2 DIABETES MELLITUS WITHOUT COMPLICATION, WITHOUT LONG-TERM CURRENT USE OF INSULIN: ICD-10-CM

## 2023-05-23 DIAGNOSIS — E83.51 HYPOCALCEMIA: ICD-10-CM

## 2023-05-23 LAB
ALBUMIN SERPL-MCNC: 3.8 G/DL (ref 3.5–5)
ALBUMIN/GLOB SERPL: 1.1 RATIO (ref 1.1–2)
ALP SERPL-CCNC: 58 UNIT/L (ref 40–150)
ALT SERPL-CCNC: 9 UNIT/L (ref 0–55)
AST SERPL-CCNC: 15 UNIT/L (ref 5–34)
BILIRUBIN DIRECT+TOT PNL SERPL-MCNC: 0.4 MG/DL
BUN SERPL-MCNC: 16 MG/DL (ref 9.8–20.1)
CALCIUM SERPL-MCNC: 9.5 MG/DL (ref 8.4–10.2)
CHLORIDE SERPL-SCNC: 103 MMOL/L (ref 98–107)
CO2 SERPL-SCNC: 28 MMOL/L (ref 22–29)
CREAT SERPL-MCNC: 0.72 MG/DL (ref 0.55–1.02)
CREAT UR-MCNC: 55.6 MG/DL (ref 47–110)
GFR SERPLBLD CREATININE-BSD FMLA CKD-EPI: >60 MLS/MIN/1.73/M2
GLOBULIN SER-MCNC: 3.5 GM/DL (ref 2.4–3.5)
GLUCOSE SERPL-MCNC: 86 MG/DL (ref 74–100)
MICROALBUMIN UR-MCNC: 7.7 UG/ML
MICROALBUMIN/CREAT RATIO PNL UR: 13.8 MG/GM CR (ref 0–30)
POTASSIUM SERPL-SCNC: 3.8 MMOL/L (ref 3.5–5.1)
PROT SERPL-MCNC: 7.3 GM/DL (ref 6.4–8.3)
SODIUM SERPL-SCNC: 140 MMOL/L (ref 136–145)

## 2023-05-23 PROCEDURE — 82043 UR ALBUMIN QUANTITATIVE: CPT

## 2023-05-23 PROCEDURE — 36415 COLL VENOUS BLD VENIPUNCTURE: CPT

## 2023-05-23 PROCEDURE — 80053 COMPREHEN METABOLIC PANEL: CPT

## 2023-06-08 ENCOUNTER — TELEPHONE (OUTPATIENT)
Dept: ENDOCRINOLOGY | Facility: CLINIC | Age: 58
End: 2023-06-08
Payer: COMMERCIAL

## 2023-06-08 NOTE — TELEPHONE ENCOUNTER
Looks like she is Dr. Loaiza's, she was seen in July last year note states to RTC in 1 year.   In October 2022 she was placed on the wait list.  Not sure how she ended up on Angela's schedule.

## 2023-06-08 NOTE — TELEPHONE ENCOUNTER
----- Message from [place]ia Enamorado sent at 6/8/2023 11:09 AM CDT -----  Regarding: Confirm provider  Please advise on who is following this patient.

## 2023-06-13 ENCOUNTER — OFFICE VISIT (OUTPATIENT)
Dept: ENDOCRINOLOGY | Facility: CLINIC | Age: 58
End: 2023-06-13
Payer: COMMERCIAL

## 2023-06-13 VITALS
TEMPERATURE: 98 F | HEIGHT: 64 IN | HEART RATE: 77 BPM | DIASTOLIC BLOOD PRESSURE: 81 MMHG | WEIGHT: 140 LBS | BODY MASS INDEX: 23.9 KG/M2 | RESPIRATION RATE: 16 BRPM | SYSTOLIC BLOOD PRESSURE: 133 MMHG

## 2023-06-13 DIAGNOSIS — E89.0 POSTSURGICAL HYPOTHYROIDISM: Primary | ICD-10-CM

## 2023-06-13 DIAGNOSIS — E89.2 POSTSURGICAL HYPOPARATHYROIDISM: ICD-10-CM

## 2023-06-13 PROCEDURE — 99214 OFFICE O/P EST MOD 30 MIN: CPT | Mod: PBBFAC

## 2023-06-13 RX ORDER — LEVOTHYROXINE SODIUM 75 UG/1
75 TABLET ORAL
Qty: 90 TABLET | Refills: 1 | Status: SHIPPED | OUTPATIENT
Start: 2023-06-13 | End: 2023-08-08 | Stop reason: SDUPTHER

## 2023-06-13 RX ORDER — CALCITRIOL 0.25 UG/1
0.25 CAPSULE ORAL DAILY
Qty: 90 CAPSULE | Refills: 1 | Status: SHIPPED | OUTPATIENT
Start: 2023-06-13 | End: 2023-08-08 | Stop reason: SDUPTHER

## 2023-06-13 RX ORDER — CALCITRIOL 0.5 UG/1
0.5 CAPSULE ORAL EVERY MORNING
Qty: 90 CAPSULE | Refills: 1 | Status: SHIPPED | OUTPATIENT
Start: 2023-06-13 | End: 2023-08-08 | Stop reason: SDUPTHER

## 2023-06-13 RX ORDER — CALCITRIOL 0.25 UG/1
0.25 CAPSULE ORAL DAILY
Qty: 90 CAPSULE | Refills: 1 | Status: SHIPPED | OUTPATIENT
Start: 2023-06-13 | End: 2023-06-13

## 2023-06-13 NOTE — PROGRESS NOTES
Endocrinology RESIDENT CLINIC  CLINIC NOTE    Patient Name: Addie Kelley  YOB: 1965    PRESENTING HISTORY       History of Present Illness:  Ms. Addie Kelley is a 57 y.o. female presenting to [facility]ology clinic for follow up visit. Follows us for hypothyroidism, hypoparathryoidism, and low vitamin D secondary to thyroidectomy.     Labs have been stable. She presents today and is doing well, no complaints. She's taking her medications. With regards to the Calcium Citrate, she's not sure of the dose of the pill she is getting OTC but takes 3 pills in the morning and then 3 pills after lunch. She denies cramping, heat/cold intolerance, diarrhea/constipation, tremors, hair loss.    ROS  As above    PAST HISTORY:     Past Medical History:   Diagnosis Date    Diabetes mellitus, type 2     Hyperlipidemia     Hypertension     Thyroid disease        Past Surgical History:   Procedure Laterality Date    THYROIDECTOMY Bilateral 2000    TUBAL LIGATION         History reviewed. No pertinent family history.    Social History     Socioeconomic History    Marital status:    Tobacco Use    Smoking status: Never    Smokeless tobacco: Never   Substance and Sexual Activity    Alcohol use: Not Currently    Drug use: Never    Sexual activity: Not Currently     Social Determinants of Health     Financial Resource Strain: Low Risk     Difficulty of Paying Living Expenses: Not hard at all   Food Insecurity: No Food Insecurity    Worried About Running Out of Food in the Last Year: Never true    Ran Out of Food in the Last Year: Never true   Transportation Needs: No Transportation Needs    Lack of Transportation (Medical): No    Lack of Transportation (Non-Medical): No   Physical Activity: Insufficiently Active    Days of Exercise per Week: 2 days    Minutes of Exercise per Session: 60 min   Stress: No Stress Concern Present    Feeling of Stress : Not at all   Social Connections: Moderately Integrated     "Frequency of Communication with Friends and Family: More than three times a week    Frequency of Social Gatherings with Friends and Family: Twice a week    Attends Buddhism Services: More than 4 times per year    Active Member of Clubs or Organizations: No    Attends Club or Organization Meetings: Never    Marital Status:    Housing Stability: Low Risk     Unable to Pay for Housing in the Last Year: No    Number of Places Lived in the Last Year: 1    Unstable Housing in the Last Year: No       MEDICATIONS & ALLERGIES:     Current Outpatient Medications on File Prior to Visit   Medication Sig    amLODIPine (NORVASC) 10 MG tablet Take 1 tablet (10 mg total) by mouth once daily.    atorvastatin (LIPITOR) 80 MG tablet Take 1 tablet (80 mg total) by mouth once daily.    calcitRIOL (ROCALTROL) 0.25 MCG Cap Take 1 capsule (0.25 mcg total) by mouth once daily. Takes with the 0.5mg dailly    calcitRIOL (ROCALTROL) 0.5 MCG Cap Take 1 capsule (0.5 mcg total) by mouth every morning.    levothyroxine (SYNTHROID) 75 MCG tablet Take 1 tablet (75 mcg total) by mouth before breakfast.    metFORMIN (GLUMETZA) 500 MG (MOD) 24hr tablet Take 2 tablets (1,000 mg total) by mouth 2 (two) times daily with meals.    metoprolol succinate (TOPROL-XL) 25 MG 24 hr tablet Take 1 tablet (25 mg total) by mouth once daily.     No current facility-administered medications on file prior to visit.       Review of patient's allergies indicates:   Allergen Reactions    Clonidine Edema       OBJECTIVE:   Vital Signs:  Vitals:    06/13/23 0951 06/13/23 0957   BP: (!) 156/79 133/81   Pulse: 77    Resp: 16    Temp: 98.2 °F (36.8 °C)    TempSrc: Oral    Weight: 63.5 kg (140 lb)    Height: 5' 4" (1.626 m)        No results found for this or any previous visit (from the past 24 hour(s)).      Physical Exam  General - Appears comfortable, appropriatley conversive   Mental Status - alert and oriented x 3, speaking in logical, relevant sentences   HEENT - " no rhinorrhea   Cardiac - RRR, no murmurs, rubs, or gallops; no edema in LE   Respiratory - breathing comfortably; clear to ascultation bilaterally   Abdominal - nondistended, soft, nontender to palpation   Extremities - LE, UE, and joints are nonerythematous and nonswollen   Skin - no rashes or bruises seen on skin      Laboratory  Lab Results   Component Value Date    WBC 5.6 10/31/2022    HGB 13.0 10/31/2022    HCT 39.9 10/31/2022    MCV 88.7 10/31/2022     10/31/2022     @[id number](GLU,NA,K,Cl,CO2,BUN,Creatinine,Calcium,MG)@  Lab Results   Component Value Date    INR 0.9 05/20/2021    INR 0.94 11/05/2017    PROTIME 12.3 05/20/2021    PROTIME 12.4 11/05/2017     Lab Results   Component Value Date    HGBA1C 6.6 10/31/2022     No results for input(s): POCTGLUCOSE in the last 72 hours.      ASSESSMENT & PLAN:     Post-surgical hypothyroidism  -s/p thyroidectomy 2/2 overactive thyroid per patient  -takes synthroid 75mcg daily separate from other medications or food  -will recheck TFT's    Post-surgical hypoparathyroidism  -Calcium, phosphorus have been normal. -will check renal function panel  -Continue Calcitriol 0.25 + 0.5mcg/day  -Continue OTC Calcium Citrate, but Ca is ULN and we would prefer it to be lower limit of normal, specially with elevated urine calcium. She takes 3 pills in AM + 3 pills in PM (will decrease this to 2 pills BID). Will get labs in 1 week to re-check calcium and phosphorus    RTC in 4 months    Discussed with Dr. Loaiza  - staff attestation to follow    Dipak Delacruz MD  Internal Medicine PGY-3

## 2023-07-17 DIAGNOSIS — E78.2 MIXED HYPERLIPIDEMIA: ICD-10-CM

## 2023-07-17 DIAGNOSIS — E11.9 TYPE 2 DIABETES MELLITUS WITHOUT COMPLICATION, WITHOUT LONG-TERM CURRENT USE OF INSULIN: ICD-10-CM

## 2023-07-17 RX ORDER — ATORVASTATIN CALCIUM 80 MG/1
80 TABLET, FILM COATED ORAL DAILY
Qty: 90 TABLET | Refills: 3 | Status: SHIPPED | OUTPATIENT
Start: 2023-07-17 | End: 2023-08-08 | Stop reason: SDUPTHER

## 2023-08-08 ENCOUNTER — OFFICE VISIT (OUTPATIENT)
Dept: INTERNAL MEDICINE | Facility: CLINIC | Age: 58
End: 2023-08-08
Payer: COMMERCIAL

## 2023-08-08 VITALS
OXYGEN SATURATION: 100 % | SYSTOLIC BLOOD PRESSURE: 148 MMHG | RESPIRATION RATE: 19 BRPM | HEIGHT: 64 IN | TEMPERATURE: 98 F | HEART RATE: 77 BPM | WEIGHT: 140 LBS | BODY MASS INDEX: 23.9 KG/M2 | DIASTOLIC BLOOD PRESSURE: 70 MMHG

## 2023-08-08 DIAGNOSIS — I10 PRIMARY HYPERTENSION: Primary | ICD-10-CM

## 2023-08-08 DIAGNOSIS — E20.9 HYPOPARATHYROIDISM, UNSPECIFIED HYPOPARATHYROIDISM TYPE: ICD-10-CM

## 2023-08-08 DIAGNOSIS — D50.9 IRON DEFICIENCY ANEMIA, UNSPECIFIED IRON DEFICIENCY ANEMIA TYPE: ICD-10-CM

## 2023-08-08 DIAGNOSIS — E89.2 POSTSURGICAL HYPOPARATHYROIDISM: ICD-10-CM

## 2023-08-08 DIAGNOSIS — E78.2 MIXED HYPERLIPIDEMIA: ICD-10-CM

## 2023-08-08 DIAGNOSIS — E89.0 POSTSURGICAL HYPOTHYROIDISM: ICD-10-CM

## 2023-08-08 DIAGNOSIS — E83.51 HYPOCALCEMIA: ICD-10-CM

## 2023-08-08 DIAGNOSIS — E11.9 TYPE 2 DIABETES MELLITUS WITHOUT COMPLICATION, WITHOUT LONG-TERM CURRENT USE OF INSULIN: ICD-10-CM

## 2023-08-08 DIAGNOSIS — E55.9 VITAMIN D DEFICIENCY: ICD-10-CM

## 2023-08-08 PROCEDURE — 99213 OFFICE O/P EST LOW 20 MIN: CPT | Mod: PBBFAC | Performed by: STUDENT IN AN ORGANIZED HEALTH CARE EDUCATION/TRAINING PROGRAM

## 2023-08-08 RX ORDER — ATORVASTATIN CALCIUM 80 MG/1
80 TABLET, FILM COATED ORAL DAILY
Qty: 90 TABLET | Refills: 3 | Status: SHIPPED | OUTPATIENT
Start: 2023-08-08

## 2023-08-08 RX ORDER — METFORMIN HYDROCHLORIDE 500 MG/1
1000 TABLET, FILM COATED, EXTENDED RELEASE ORAL 2 TIMES DAILY WITH MEALS
Qty: 360 TABLET | Refills: 3 | Status: SHIPPED | OUTPATIENT
Start: 2023-08-08 | End: 2024-01-17 | Stop reason: SDUPTHER

## 2023-08-08 RX ORDER — AMLODIPINE BESYLATE 10 MG/1
10 TABLET ORAL DAILY
Qty: 30 TABLET | Refills: 11 | Status: CANCELLED | OUTPATIENT
Start: 2023-08-08 | End: 2024-08-07

## 2023-08-08 RX ORDER — CALCITRIOL 0.25 UG/1
0.25 CAPSULE ORAL DAILY
Qty: 90 CAPSULE | Refills: 1 | Status: SHIPPED | OUTPATIENT
Start: 2023-08-08

## 2023-08-08 RX ORDER — LEVOTHYROXINE SODIUM 75 UG/1
75 TABLET ORAL
Qty: 90 TABLET | Refills: 1 | Status: SHIPPED | OUTPATIENT
Start: 2023-08-08

## 2023-08-08 RX ORDER — AMLODIPINE BESYLATE 10 MG/1
10 TABLET ORAL DAILY
Qty: 90 TABLET | Refills: 3 | Status: SHIPPED | OUTPATIENT
Start: 2023-08-08

## 2023-08-08 RX ORDER — METOPROLOL SUCCINATE 25 MG/1
25 TABLET, EXTENDED RELEASE ORAL DAILY
Qty: 90 TABLET | Refills: 3 | Status: SHIPPED | OUTPATIENT
Start: 2023-08-08

## 2023-08-08 RX ORDER — METOPROLOL SUCCINATE 25 MG/1
25 TABLET, EXTENDED RELEASE ORAL DAILY
Qty: 30 TABLET | Refills: 11 | Status: CANCELLED | OUTPATIENT
Start: 2023-08-08 | End: 2024-08-07

## 2023-08-08 RX ORDER — CALCITRIOL 0.5 UG/1
0.5 CAPSULE ORAL EVERY MORNING
Qty: 90 CAPSULE | Refills: 1 | Status: SHIPPED | OUTPATIENT
Start: 2023-08-08 | End: 2024-02-08 | Stop reason: SDUPTHER

## 2023-08-08 NOTE — PROGRESS NOTES
"[facility] Visit    Chief Complaint:      Follow-up (Pt here today for f/u visit. No distress noted. )     Subjective:     HPI:  Ms. Kelley is a 57 year old female with PMH of hypothyroidism 2/2 thyroidectomy, hypoparathyroidism, HTN, HLD, DM Type II here for clinic f/u, 5/17/23. Denies any complaints today other than still having some hearing loss. Requesting refills today.     Review of Systems  Constitutional: no fever, fatigue, weakness  Eye: no vision loss, eye redness, drainage, or pain  ENMT: no sore throat, ear pain, hearing loss; +sensation of "something in ear," no sinus pain/congestion, nasal congestion/drainage  Respiratory: no cough, no wheezing, no shortness of breath  Cardiovascular: no chest pain, no palpitations, no edema  Gastrointestinal: no nausea, vomiting, or diarrhea. No abdominal pain  Genitourinary: no dysuria, no urinary frequency or urgency, no hematuria  Hema/Lymph: no abnormal bruising or bleeding  Endocrine: no heat or cold intolerance, no excessive thirst or excessive urination  Musculoskeletal: no muscle or joint pain, no joint swelling  Integumentary: no rashes, lesions  Neurologic: no headache, no dizziness, no weakness or numbness    Objective:   Last 24 Hour Vital Signs:  Vitals  BP: (!) 148/70  Temp: 98 °F (36.7 °C)  Temp Source: Oral  Pulse: 77  Resp: 19  SpO2: 100 %  Height: 5' 4" (162.6 cm)  Weight: 63.5 kg (140 lb)    Physical Examination:    General: Well nourished, well developed in NAD  HEENT: PERRLA, NC/AT, MMM  Respiratory: CTAB, normal respiratory effort  Cardiovascular: RRR, no m/r/g  Gastrointestinal: S, NT, ND, active BS, no masses palpable  Musculoskeletal: full range of motion of all extremities/spine without limitation or discomfort  Integumentary: cluster of plantar warts to 2nd toe medial aspect, multiple overgrown toenails b/l feet  Neurologic: AAOx4, CN II-XII grossly intact, normal gait  Diabetic foot exam:   Left: Reflexes 2+ " "   Vibratory sensation normal   Proprioception normal   Sharp/dull discrimination normal   Filament test present  Right: Reflexes 2+    Vibratory sensation normal   Proprioception normal   Sharp/dull discrimination normal   Filament test present        Labs  BMP:   Lab Results   Component Value Date    CHLORIDE 103 05/23/2023    CO2 28 05/23/2023    BUN 16.0 05/23/2023    CREATININE 0.72 05/23/2023    GLUCOSE 86 05/23/2023    CALCIUM 9.5 05/23/2023     CBC:   Lab Results   Component Value Date    WBC 5.6 10/31/2022    HGB 13.0 10/31/2022    HCT 39.9 10/31/2022    MCV 88.7 10/31/2022    RDW 13.0 10/31/2022     LFTs:   Lab Results   Component Value Date    LABPROT 7.3 05/23/2023    ALBUMIN 3.8 05/23/2023    AST 15 05/23/2023    ALT 9 05/23/2023    ALKPHOS 58 05/23/2023     FLP:   Lab Results   Component Value Date    CHOL 200 10/31/2022    HDL 62 (H) 10/31/2022    .00 10/31/2022    TRIG 63 10/31/2022    TOTALCHOLEST 3 10/31/2022     DM:   Lab Results   Component Value Date    HGBA1C 6.6 10/31/2022    .7 10/31/2022    CREATININE 0.72 05/23/2023    CREATRANDUR 55.6 05/23/2023    PROTEINURINE <6.8 10/31/2022     Thyroid:   Lab Results   Component Value Date    TSH 0.5000 07/22/2022    [id number] 1.12 06/29/2021     Anemia: No results found for: "IRON", "TIBC", "FERRITIN", "[id number]", "FOLATE"          Assessment & Plan:     Hx of Abnormal Uterine Bleeding  -Cystic lesion of right ovary up to 4.2 cm, complex cyst, 3.2 cm calcified fibroid ultrasound back in 2016  -Seen in GYN clinic, noted that AUB was due to perimenopausal period; no longer having symptoms  -Pap smear NIL HPV negative in 2016  -GYN appt in rescheduled for the Jan 2024     Postsurgical Hypoparathyroidism  Hyperphosphatemia 2/2 Above  -Following in Endocrinology Clinic, last seen 7/22/22  -On Calcitriol 0.50 mcg qAM & Calcitriol 0.25 mcg qPM  -On Calcium Citrate 1900 mg TID  -7/22/22 Calcium of 8/7, phos 4.7  -Repeat CMP today   "   Postsurgical Hypothyroidism  -Denies any symptoms of fatigue, cold intolerance, constipation  -Continued Levothyroxine 75 mcg  -Latest TSH 0.5 WNL on 7/22/22  -Endocrinology following in clinic      Type II Diabetes Mellitus  -Hgb A1c: 6.6 ON 10/31/22 (previous was 6.7); repeat A1c ordered  -Urine Protein/Creatinine ratio: repeat ordered  -Annual Fundus Photo: UTD  -DM foot exam: UTD 8/8/23 and WNL; repeat annually  -Annual Lipid Screening: on atorvastatin 80  -BP < 140/90: at goal  -Medication: Continue metformin 1000 mg BID      HLD  -Lipid panel (10/31/22): Chol 200, HDL 62, Tri 63,   -Had increased atorvastatin to 80 mg daily (from 40), however pt reports noncompliance with medication- has agreed to start taking regularly  -Will obtain annually she      Primary HTN  -BP above goal 148/81; manual repeat 148/70  -Does take BP at home, states home BP's usually 110-130/60/70s; will send BP log and check home readings prior to changing regimen  -Continue amlodipine 10 mg daily and metoprolol succ 25 mg daily        Health Maintenance  -Cervical Cancer Screening: Pap smear NIL HPV negative in 2016, referral placed today to GYN for repeat   -Breast Cancer Screening: UTD MMG/Juan Pablo Birads-1 negative 3/29/23; repeat in 1 year  -Colon Cancer Screening: Colonoscopy 11/20 done at outside facility, found to be negative; repeat in 10 years in 2030  -Immunizations:    -TDap: UTD    -Pneumonia: Prevnar 20 today 5/17/23    -Covid: UTD    -Shingles: Shingrex UTD  -HCV lifetime screening: negative  -HIV lifetime screening: negative        Follow-ups  -Follow-up medicine clinic in 6 months with labs      Bon Zapien MD  [employer] IM PGY III

## 2023-08-08 NOTE — PROGRESS NOTES
I have reviewed the notes, assessments, and management plan performed by resident, I concur with his documentation of Addie Kelley.

## 2023-10-31 ENCOUNTER — OFFICE VISIT (OUTPATIENT)
Dept: ENDOCRINOLOGY | Facility: CLINIC | Age: 58
End: 2023-10-31
Payer: COMMERCIAL

## 2023-10-31 ENCOUNTER — TELEPHONE (OUTPATIENT)
Dept: ENDOCRINOLOGY | Facility: CLINIC | Age: 58
End: 2023-10-31
Payer: COMMERCIAL

## 2023-10-31 VITALS
BODY MASS INDEX: 23.9 KG/M2 | HEIGHT: 64 IN | HEART RATE: 79 BPM | SYSTOLIC BLOOD PRESSURE: 140 MMHG | WEIGHT: 140 LBS | TEMPERATURE: 99 F | DIASTOLIC BLOOD PRESSURE: 84 MMHG | RESPIRATION RATE: 16 BRPM

## 2023-10-31 DIAGNOSIS — E89.0 POSTSURGICAL HYPOTHYROIDISM: Primary | ICD-10-CM

## 2023-10-31 DIAGNOSIS — E55.9 VITAMIN D DEFICIENCY: ICD-10-CM

## 2023-10-31 DIAGNOSIS — E89.0 POSTSURGICAL HYPOTHYROIDISM: ICD-10-CM

## 2023-10-31 DIAGNOSIS — E83.51 HYPOCALCEMIA: ICD-10-CM

## 2023-10-31 DIAGNOSIS — E89.2 POSTSURGICAL HYPOPARATHYROIDISM: ICD-10-CM

## 2023-10-31 DIAGNOSIS — E83.51 HYPOCALCEMIA: Primary | ICD-10-CM

## 2023-10-31 LAB
DEPRECATED CALCIDIOL+CALCIFEROL SERPL-MC: 64.2 NG/ML (ref 30–80)
TSH SERPL-ACNC: 1.32 UIU/ML (ref 0.35–4.94)

## 2023-10-31 PROCEDURE — 84443 ASSAY THYROID STIM HORMONE: CPT

## 2023-10-31 PROCEDURE — 80069 RENAL FUNCTION PANEL: CPT

## 2023-10-31 PROCEDURE — 99214 OFFICE O/P EST MOD 30 MIN: CPT | Mod: PBBFAC

## 2023-10-31 PROCEDURE — 82306 VITAMIN D 25 HYDROXY: CPT

## 2023-10-31 PROCEDURE — 36415 COLL VENOUS BLD VENIPUNCTURE: CPT

## 2023-11-01 LAB
ALBUMIN SERPL-MCNC: 4.2 G/DL (ref 3.5–5)
BUN SERPL-MCNC: 11.5 MG/DL (ref 9.8–20.1)
CALCIUM SERPL-MCNC: 7.8 MG/DL (ref 8.4–10.2)
CHLORIDE SERPL-SCNC: 103 MMOL/L (ref 98–107)
CO2 SERPL-SCNC: 23 MMOL/L (ref 22–29)
CREAT SERPL-MCNC: 0.74 MG/DL (ref 0.55–1.02)
GFR SERPLBLD CREATININE-BSD FMLA CKD-EPI: >60 MLS/MIN/1.73/M2
GLUCOSE SERPL-MCNC: 186 MG/DL (ref 74–100)
PHOSPHATE SERPL-MCNC: 4.8 MG/DL (ref 2.3–4.7)
POTASSIUM SERPL-SCNC: 3.8 MMOL/L (ref 3.5–5.1)
SODIUM SERPL-SCNC: 144 MMOL/L (ref 136–145)

## 2023-11-01 NOTE — TELEPHONE ENCOUNTER
Spoke with pt informed calcium is low and phos is high, pt states she did not take her calcium before she had labs done, states she takes it around 11 am, she is taking calcium citrate 400 mg, 3 tablets once per day.

## 2023-11-01 NOTE — TELEPHONE ENCOUNTER
Labs are all back and calcium is low and phos is high.    Please clarify if perhaps she forgot to take her calcium the morning they were drawn?  If not and she took it then an adjustment may be warranted and would clarify the mg strength of her calcium supplements and that it is still calcium citrate and not calcium carbonate she it taking at 3 tabs once per day.

## 2023-11-01 NOTE — TELEPHONE ENCOUNTER
Labs show her thyroid and vit d are at goal.    For her calcium, she may have more consistent symptoms control with instead of taking her calcium 3 tabs all at once to take 1 TID or 1-2 in the morning and the other 1-2 at night with her calcitriol.    Recommend she make this change and when she comes to drop off her 24 hour urine, repeat a renal panel on the new regimen.

## 2023-11-03 NOTE — TELEPHONE ENCOUNTER
Informed the patient her Labs show her thyroid and vit d are at goal. Dr Loaiza recommends she may have more consistent symptoms control if she takes her calcium TID , or 1-2 in the morning and the other 1-2 at night with her calcitriol, instead of  taking 3 tabs all at once. Advised that she repeat a renal panel on the new regimen when she comes to drop off her 24 hour urine.    Patient voiced understanding and stated she'll take 1-2 in the morning and the other 1-2 at night with her calcitriol and that she'll begin the 24hr urine collection this weekend since she is off work and able to keep it on ice/refrigerated.

## 2023-11-06 ENCOUNTER — LAB VISIT (OUTPATIENT)
Dept: LAB | Facility: HOSPITAL | Age: 58
End: 2023-11-06
Attending: INTERNAL MEDICINE
Payer: COMMERCIAL

## 2023-11-06 DIAGNOSIS — E89.2 POSTSURGICAL HYPOPARATHYROIDISM: ICD-10-CM

## 2023-11-06 DIAGNOSIS — E55.9 VITAMIN D DEFICIENCY: ICD-10-CM

## 2023-11-06 LAB
CALCIUM 24H UR-MCNC: 338 MG/DAY (ref 100–300)
CALCIUM UR-MCNC: 13 MG/DL
CREAT 24H UR-MCNC: 1294.8 MG/DAY (ref 950–2490)
CREAT UR-MCNC: 49.8 MG/DL (ref 45–106)
TOTAL VOLUME  (OHS): 2600 ML
TOTAL VOLUME  (OHS): 2600 ML

## 2023-11-06 PROCEDURE — 82570 ASSAY OF URINE CREATININE: CPT

## 2023-11-06 NOTE — TELEPHONE ENCOUNTER
Pt appears to have dropped off the urine and only a 24 hour urine creatinine was done, please add the 24 hour urine calcium.

## 2023-11-07 NOTE — TELEPHONE ENCOUNTER
24 hour urine is back, pt didn't do the renal panel.    It suggests room to back off her calcium/calcitriol regimen.  However, I'm not comfortable doing so without an updated renal panel on her adjusted regimen.    Options are for her to come back and get it or continue current mgmt and plan for renal panel, TSH prior to f/u to regroup and further sort.  Her choice, sort orders accordingly.

## 2023-11-08 NOTE — TELEPHONE ENCOUNTER
Attempted to reach patient, no answer, unable to leave message, voicemail box not set up.  Will try again later.

## 2023-11-10 ENCOUNTER — LAB VISIT (OUTPATIENT)
Dept: LAB | Facility: HOSPITAL | Age: 58
End: 2023-11-10
Attending: INTERNAL MEDICINE
Payer: COMMERCIAL

## 2023-11-10 DIAGNOSIS — E78.2 MIXED HYPERLIPIDEMIA: ICD-10-CM

## 2023-11-10 DIAGNOSIS — E11.9 TYPE 2 DIABETES MELLITUS WITHOUT COMPLICATION, WITHOUT LONG-TERM CURRENT USE OF INSULIN: ICD-10-CM

## 2023-11-10 DIAGNOSIS — E89.0 POSTSURGICAL HYPOTHYROIDISM: ICD-10-CM

## 2023-11-10 DIAGNOSIS — E20.9 HYPOPARATHYROIDISM, UNSPECIFIED HYPOPARATHYROIDISM TYPE: ICD-10-CM

## 2023-11-10 DIAGNOSIS — E89.2 POSTSURGICAL HYPOPARATHYROIDISM: ICD-10-CM

## 2023-11-10 DIAGNOSIS — E83.51 HYPOCALCEMIA: ICD-10-CM

## 2023-11-10 LAB
ALBUMIN SERPL-MCNC: 3.9 G/DL (ref 3.5–5)
BUN SERPL-MCNC: 16.6 MG/DL (ref 9.8–20.1)
CALCIUM SERPL-MCNC: 8.3 MG/DL (ref 8.4–10.2)
CHLORIDE SERPL-SCNC: 103 MMOL/L (ref 98–107)
CHOLEST SERPL-MCNC: 221 MG/DL
CHOLEST/HDLC SERPL: 3 {RATIO} (ref 0–5)
CO2 SERPL-SCNC: 27 MMOL/L (ref 22–29)
CREAT SERPL-MCNC: 0.72 MG/DL (ref 0.55–1.02)
DEPRECATED CALCIDIOL+CALCIFEROL SERPL-MC: 62.9 NG/ML (ref 30–80)
EST. AVERAGE GLUCOSE BLD GHB EST-MCNC: 142.7 MG/DL
GFR SERPLBLD CREATININE-BSD FMLA CKD-EPI: >60 MLS/MIN/1.73/M2
GLUCOSE SERPL-MCNC: 135 MG/DL (ref 74–100)
HBA1C MFR BLD: 6.6 %
HDLC SERPL-MCNC: 70 MG/DL (ref 35–60)
LDLC SERPL CALC-MCNC: 139 MG/DL (ref 50–140)
PHOSPHATE SERPL-MCNC: 5.1 MG/DL (ref 2.3–4.7)
POTASSIUM SERPL-SCNC: 4 MMOL/L (ref 3.5–5.1)
SODIUM SERPL-SCNC: 142 MMOL/L (ref 136–145)
TRIGL SERPL-MCNC: 62 MG/DL (ref 37–140)
TSH SERPL-ACNC: 0.87 UIU/ML (ref 0.35–4.94)
VLDLC SERPL CALC-MCNC: 12 MG/DL

## 2023-11-10 PROCEDURE — 84443 ASSAY THYROID STIM HORMONE: CPT

## 2023-11-10 PROCEDURE — 80061 LIPID PANEL: CPT

## 2023-11-10 PROCEDURE — 36415 COLL VENOUS BLD VENIPUNCTURE: CPT

## 2023-11-10 PROCEDURE — 82306 VITAMIN D 25 HYDROXY: CPT

## 2023-11-10 PROCEDURE — 83036 HEMOGLOBIN GLYCOSYLATED A1C: CPT

## 2023-11-10 PROCEDURE — 80069 RENAL FUNCTION PANEL: CPT

## 2023-11-14 NOTE — PROGRESS NOTES
Endocrinology RESIDENT CLINIC  CLINIC NOTE    Patient Name: Addie Kelley  YOB: 1965    PRESENTING HISTORY       History of Present Illness:  Ms. Addie Kelley is a 58 y.o. female presenting to [facility] Endocr[facility]ology clinic for follow up visit. Follows us for hypothyroidism, hypoparathryoidism, and low vitamin D secondary to thyroidectomy in 2000.     She presents today and is doing well, no complaints. She's taking her medications. With regards to the Calcium Citrate, she takes 3 pills in the morning down from 4 pills previously. She also takes calcitriol 0.5 mcg in the morning and 0.25 mcg in the afternoon. She denies cramping, heat/cold intolerance, diarrhea/constipation, tremors. Did not complete labs prior to this visit so will do so prior to next visit.    ROS  As above    PAST HISTORY:     Past Medical History:   Diagnosis Date    Diabetes mellitus, type 2     Hyperlipidemia     Hypertension     Thyroid disease        Past Surgical History:   Procedure Laterality Date    THYROIDECTOMY Bilateral 2000    TUBAL LIGATION         History reviewed. No pertinent family history.    Social History     Socioeconomic History    Marital status:    Tobacco Use    Smoking status: Never    Smokeless tobacco: Never   Substance and Sexual Activity    Alcohol use: Not Currently    Drug use: Never    Sexual activity: Not Currently     Social Determinants of Health     Financial Resource Strain: Low Risk  (11/1/2022)    Overall Financial Resource Strain (CARDIA)     Difficulty of Paying Living Expenses: Not hard at all   Food Insecurity: No Food Insecurity (11/1/2022)    Hunger Vital Sign     Worried About Running Out of Food in the Last Year: Never true     Ran Out of Food in the Last Year: Never true   Transportation Needs: No Transportation Needs (11/1/2022)    PRAPARE - Transportation     Lack of Transportation (Medical): No     Lack of Transportation (Non-Medical): No   Physical Activity:  Insufficiently Active (11/1/2022)    Exercise Vital Sign     Days of Exercise per Week: 2 days     Minutes of Exercise per Session: 60 min   Stress: No Stress Concern Present (11/1/2022)    [employer] - Occupational Stress Questionnaire     Feeling of Stress : Not at all   Social Connections: Moderately Integrated (11/1/2022)    Social Connection and Isolation Panel [NHANES]     Frequency of Communication with Friends and Family: More than three times a week     Frequency of Social Gatherings with Friends and Family: Twice a week     Attends Confucianism Services: More than 4 times per year     Active Member of Clubs or Organizations: No     Attends Club or Organization Meetings: Never     Marital Status:    Housing Stability: Low Risk  (11/1/2022)    Housing Stability Vital Sign     Unable to Pay for Housing in the Last Year: No     Number of Places Lived in the Last Year: 1     Unstable Housing in the Last Year: No       MEDICATIONS & ALLERGIES:     Current Outpatient Medications on File Prior to Visit   Medication Sig    amLODIPine (NORVASC) 10 MG tablet Take 1 tablet (10 mg total) by mouth once daily.    atorvastatin (LIPITOR) 80 MG tablet Take 1 tablet (80 mg total) by mouth once daily.    calcitRIOL (ROCALTROL) 0.25 MCG Cap Take 1 capsule (0.25 mcg total) by mouth once daily. Take in the afternoons while still taking the 0.5mcg dose in the mornings    calcitRIOL (ROCALTROL) 0.5 MCG Cap Take 1 capsule (0.5 mcg total) by mouth every morning.    levothyroxine (SYNTHROID) 75 MCG tablet Take 1 tablet (75 mcg total) by mouth before breakfast.    metFORMIN (GLUMETZA) 500 MG (MOD) 24hr tablet Take 2 tablets (1,000 mg total) by mouth 2 (two) times daily with meals.    metoprolol succinate (TOPROL-XL) 25 MG 24 hr tablet Take 1 tablet (25 mg total) by mouth once daily.     No current facility-administered medications on file prior to visit.       Review of patient's allergies indicates:  "  Allergen Reactions    Clonidine Edema       OBJECTIVE:   Vital Signs:  Vitals:    10/31/23 0849 10/31/23 0852   BP: (!) 144/84 (!) 140/84   Pulse: 79    Resp: 16    Temp: 98.5 °F (36.9 °C)    TempSrc: Oral    Weight: 63.5 kg (140 lb)    Height: 5' 4" (1.626 m)        No results found for this or any previous visit (from the past 24 hour(s)).      Physical Exam  General - Appears comfortable, appropriatey conversive   Mental Status - alert and oriented x 3, speaking in logical, relevant sentences   HEENT - no rhinorrhea   Cardiac - RRR, no murmurs, rubs, or gallops; no edema in LE   Respiratory - breathing comfortably; clear to ascultation bilaterally   Abdominal - nondistended, soft, nontender to palpation   Extremities - LE, UE, and joints are nonerythematous and nonswollen   Skin - no rashes or bruises seen on skin      Laboratory  Lab Results   Component Value Date    WBC 5.6 10/31/2022    HGB 13.0 10/31/2022    HCT 39.9 10/31/2022    MCV 88.7 10/31/2022     10/31/2022     No results for input(s): "GLU", "NA", "K", "CL", "CO2", "BUN", "CREATININE", "CALCIUM", "MG" in the last 72 hours.    Lab Results   Component Value Date    INR 0.9 05/20/2021    INR 0.94 11/05/2017    PROTIME 12.3 05/20/2021    PROTIME 12.4 11/05/2017     Lab Results   Component Value Date    HGBA1C 6.6 10/31/2022     No results for input(s): "POCTGLUCOSE" in the last 72 hours.      ASSESSMENT & PLAN:     Post-surgical hypothyroidism  -s/p thyroidectomy 2/2 overactive thyroid per patient  -takes synthroid 75mcg daily separate from other medications or food  -TFTs ordered at last visit, not completed  -Follow up TFTs and adjust synthroid pending results    Post-surgical hypoparathyroidism  -Calcium, phosphorus have been normal.   -Renal function panel ordered at last visit, not completed  -24 Hr Urine Calcium elevated at 396 in 10/2022  -24 Hr Urine Creatinine wnl in 10/2022  -Continue Calcitriol 0.5 mcg in AM + 0.25mcg in PM  -Continue " OTC Calcium Citrate, 3 pills in the AM, down from 4 pills  -Follow up renal function panel, vit D level, 24 hr urine calcium, 24 hr urine creatinine    Vitamin D deficiency  -Previously Vt D 26.26 in 2017  -Vit D level 70.7 in 6/2021  -Repeat vit d level ordered at last visit, not completed    RTC in 6 months with renal function panel, TSH, vit D, 24hr urine calcium, 24hr urine creatinine    Discussed with Dr. Loaiza  - staff attestation to follow      Sandi Phillips MD  [facility] Internal Medicine, HO-2  10/31/2023    Detail Level: Zone

## 2023-11-15 RX ORDER — HYDROCHLOROTHIAZIDE 12.5 MG/1
12.5 TABLET ORAL DAILY
Qty: 90 TABLET | Refills: 2 | Status: SHIPPED | OUTPATIENT
Start: 2023-11-15 | End: 2024-08-11

## 2023-11-15 NOTE — ADDENDUM NOTE
Addended by: BEVERLY RICHMOND on: 11/15/2023 11:17 AM     Modules accepted: Orders    
Addended by: BEVERLY RICHMOND on: 11/3/2023 03:25 PM     Modules accepted: Orders    
Addended by: DEVON JEAN on: 10/31/2023 01:36 PM     Modules accepted: Orders    
Addended by: DEVON JEAN on: 11/1/2023 07:31 AM     Modules accepted: Orders    
Addended by: DEVON JEAN on: 11/15/2023 10:04 AM     Modules accepted: Orders    
Addended by: KING ALEX on: 11/3/2023 02:55 PM     Modules accepted: Orders    
Male

## 2023-11-15 NOTE — TELEPHONE ENCOUNTER
Additional labs are back and suggest increased urinary calcium excretion with increased risk over time to develop kidney stones.  Given her thyroid/parathyroid problems and this finding along w/ borderline calcium levels and borderline phos levels, recommend we add HCTZ which may help this and her BP.    To this end, would erx HCTZ 12.5mg po daily, #90, 2 refills.  Continue LT4, calcium supplement, and calcitriol.    Labs prior to f/u including renal panel, TSH, vit d, 24 hour urine calcium and creatinine.    Continue to f/u w/ PCP regarding DM and hyperlipidemia.

## 2024-01-17 DIAGNOSIS — E11.9 TYPE 2 DIABETES MELLITUS WITHOUT COMPLICATION, WITHOUT LONG-TERM CURRENT USE OF INSULIN: ICD-10-CM

## 2024-01-17 RX ORDER — METFORMIN HYDROCHLORIDE 500 MG/1
1000 TABLET, FILM COATED, EXTENDED RELEASE ORAL 2 TIMES DAILY WITH MEALS
Qty: 360 TABLET | Refills: 3 | Status: SHIPPED | OUTPATIENT
Start: 2024-01-17 | End: 2024-01-25

## 2024-01-22 DIAGNOSIS — E11.9 TYPE 2 DIABETES MELLITUS WITHOUT COMPLICATION, WITHOUT LONG-TERM CURRENT USE OF INSULIN: Primary | ICD-10-CM

## 2024-01-22 NOTE — TELEPHONE ENCOUNTER
Pt Rx for Metformin  (Glumetza 500mg) is not covered under pt insurance anymore as of Jan1, 2024. Even with a PA submitted it is still not covered. Reached out to Pt insurance and the Metformin HCL 500mg tabs Qty: 120 or Qty: 360 is covered per calm.  Please Advise. Or resend new Rx to [facility] pharmacy.

## 2024-01-25 RX ORDER — METFORMIN HYDROCHLORIDE 500 MG/1
500 TABLET ORAL 2 TIMES DAILY WITH MEALS
Qty: 180 TABLET | Refills: 3 | Status: SHIPPED | OUTPATIENT
Start: 2024-01-25 | End: 2024-05-16 | Stop reason: SDUPTHER

## 2024-01-25 NOTE — TELEPHONE ENCOUNTER
Pt identified using name and date of birth pt informed that med request sent  Pharmacy called with clarification

## 2024-01-25 NOTE — TELEPHONE ENCOUNTER
Please approve this Glumetza alternative if appropriate . Thanks  Pt has called here and patient advocate x3

## 2024-02-07 DIAGNOSIS — E89.2 POSTSURGICAL HYPOPARATHYROIDISM: ICD-10-CM

## 2024-02-07 NOTE — TELEPHONE ENCOUNTER
----- Message from [place] Enamorado sent at 2/7/2024  1:04 PM CST -----  Regarding: Med mgmt  STOUT PT       Pt LVM stating that she needs an authorization for Vitamin D.   Elizabeth de la o [place]   Pt # [phone]

## 2024-02-08 DIAGNOSIS — E89.2 POSTSURGICAL HYPOPARATHYROIDISM: ICD-10-CM

## 2024-02-09 RX ORDER — CALCITRIOL 0.5 UG/1
0.5 CAPSULE ORAL EVERY MORNING
Qty: 90 CAPSULE | Refills: 1 | Status: SHIPPED | OUTPATIENT
Start: 2024-02-09 | End: 2024-05-16 | Stop reason: SDUPTHER

## 2024-02-09 NOTE — TELEPHONE ENCOUNTER
Pt is requesting a refill on calcitriol 0.5 mg.       Last visit in [facility] ENDOCRINOLOGY: 10/31/2023    Patient's next visit in [facility] ENDOCRINOLOGY: 6/14/2024

## 2024-02-14 RX ORDER — CALCITRIOL 0.5 UG/1
0.5 CAPSULE ORAL EVERY MORNING
Qty: 90 CAPSULE | Refills: 1 | Status: SHIPPED | OUTPATIENT
Start: 2024-02-14 | End: 2024-05-16 | Stop reason: SDUPTHER

## 2024-04-22 DIAGNOSIS — E89.2 POSTSURGICAL HYPOPARATHYROIDISM: ICD-10-CM

## 2024-04-22 RX ORDER — LEVOTHYROXINE SODIUM 75 UG/1
75 TABLET ORAL
Qty: 90 TABLET | Refills: 1 | Status: SHIPPED | OUTPATIENT
Start: 2024-04-22 | End: 2024-05-16 | Stop reason: SDUPTHER

## 2024-05-16 ENCOUNTER — OFFICE VISIT (OUTPATIENT)
Dept: INTERNAL MEDICINE | Facility: CLINIC | Age: 59
End: 2024-05-16
Payer: COMMERCIAL

## 2024-05-16 VITALS
HEART RATE: 76 BPM | OXYGEN SATURATION: 100 % | DIASTOLIC BLOOD PRESSURE: 74 MMHG | SYSTOLIC BLOOD PRESSURE: 152 MMHG | RESPIRATION RATE: 18 BRPM | TEMPERATURE: 98 F | HEIGHT: 64 IN | BODY MASS INDEX: 23.93 KG/M2 | WEIGHT: 140.19 LBS

## 2024-05-16 DIAGNOSIS — Z12.31 ENCOUNTER FOR SCREENING MAMMOGRAM FOR MALIGNANT NEOPLASM OF BREAST: ICD-10-CM

## 2024-05-16 DIAGNOSIS — E89.2 POSTSURGICAL HYPOPARATHYROIDISM: ICD-10-CM

## 2024-05-16 DIAGNOSIS — I10 PRIMARY HYPERTENSION: Primary | ICD-10-CM

## 2024-05-16 DIAGNOSIS — E83.51 HYPOCALCEMIA: ICD-10-CM

## 2024-05-16 DIAGNOSIS — E78.2 MIXED HYPERLIPIDEMIA: ICD-10-CM

## 2024-05-16 DIAGNOSIS — E89.0 POSTSURGICAL HYPOTHYROIDISM: ICD-10-CM

## 2024-05-16 DIAGNOSIS — E11.9 TYPE 2 DIABETES MELLITUS WITHOUT COMPLICATION, WITHOUT LONG-TERM CURRENT USE OF INSULIN: ICD-10-CM

## 2024-05-16 PROCEDURE — 99214 OFFICE O/P EST MOD 30 MIN: CPT | Mod: PBBFAC | Performed by: STUDENT IN AN ORGANIZED HEALTH CARE EDUCATION/TRAINING PROGRAM

## 2024-05-16 RX ORDER — METFORMIN HYDROCHLORIDE 500 MG/1
500 TABLET ORAL 2 TIMES DAILY WITH MEALS
Qty: 180 TABLET | Refills: 3 | Status: SHIPPED | OUTPATIENT
Start: 2024-05-16 | End: 2024-06-14

## 2024-05-16 RX ORDER — HYDROCHLOROTHIAZIDE 25 MG/1
25 TABLET ORAL DAILY
Qty: 90 TABLET | Refills: 3 | Status: SHIPPED | OUTPATIENT
Start: 2024-05-16 | End: 2024-06-14 | Stop reason: SDUPTHER

## 2024-05-16 RX ORDER — ATORVASTATIN CALCIUM 80 MG/1
80 TABLET, FILM COATED ORAL DAILY
Qty: 30 TABLET | Refills: 3 | Status: SHIPPED | OUTPATIENT
Start: 2024-05-16

## 2024-05-16 RX ORDER — METOPROLOL SUCCINATE 25 MG/1
25 TABLET, EXTENDED RELEASE ORAL DAILY
Qty: 90 TABLET | Refills: 3 | Status: SHIPPED | OUTPATIENT
Start: 2024-05-16

## 2024-05-16 RX ORDER — CALCITRIOL 0.25 UG/1
0.25 CAPSULE ORAL DAILY
Qty: 90 CAPSULE | Refills: 1 | Status: SHIPPED | OUTPATIENT
Start: 2024-05-16 | End: 2024-06-14 | Stop reason: SDUPTHER

## 2024-05-16 RX ORDER — CALCITRIOL 0.5 UG/1
0.5 CAPSULE ORAL EVERY MORNING
Qty: 90 CAPSULE | Refills: 1 | Status: SHIPPED | OUTPATIENT
Start: 2024-05-16 | End: 2024-06-14 | Stop reason: SDUPTHER

## 2024-05-16 RX ORDER — LEVOTHYROXINE SODIUM 75 UG/1
75 TABLET ORAL
Qty: 90 TABLET | Refills: 1 | Status: SHIPPED | OUTPATIENT
Start: 2024-05-16 | End: 2024-06-14 | Stop reason: SDUPTHER

## 2024-05-16 RX ORDER — AMLODIPINE BESYLATE 10 MG/1
10 TABLET ORAL DAILY
Qty: 90 TABLET | Refills: 3 | Status: SHIPPED | OUTPATIENT
Start: 2024-05-16

## 2024-05-16 NOTE — PROGRESS NOTES
"[facility] Clinic Visit    Chief Complaint:      No chief complaint on file.     Subjective:     HPI:  Ms. Kelley is a 57 year old female with PMH of hypothyroidism 2/2 thyroidectomy, hypoparathyroidism, HTN, HLD, DM Type II here for clinic f/u, 5/17/23. Denies any complaints today. BP elevated, states its a little better at home but recent visits consistently in 140-150/70s. Denies chest pain, palpitations, n/v, cough, SOB, abdominal pain, diarrhea, melena, hematochezia, dysuria, tingling/numbness, or rash.     Review of Systems  12 point ROS negative unless mentioned above    Objective:   Last 24 Hour Vital Signs:  Vitals  BP: (!) 152/74  Temp: 98.1 °F (36.7 °C)  Temp Source: Oral  Pulse: 76  Resp: 18  SpO2: 100 %  Height: 5' 4" (162.6 cm)  Weight: 63.6 kg (140 lb 3.2 oz)    Physical Examination:    General: Well nourished, well developed in NAD  HEENT: PERRLA, NC/AT, MMM  Respiratory: CTAB, normal respiratory effort  Cardiovascular: RRR, no m/r/g  Gastrointestinal: S, NT, ND, active BS, no masses palpable  Musculoskeletal: full range of motion of all extremities/spine without limitation or discomfort  Integumentary: cluster of plantar warts to 2nd toe medial aspect, multiple overgrown toenails b/l feet  Neurologic: AAOx4, CN II-XII grossly intact, normal gait          Labs  BMP:   Lab Results   Component Value Date    CHLORIDE 103 11/10/2023    CO2 27 11/10/2023    BUN 16.6 11/10/2023    CREATININE 0.72 11/10/2023    GLUCOSE 135 (H) 11/10/2023    CALCIUM 8.3 (L) 11/10/2023     CBC:   Lab Results   Component Value Date    WBC 5.6 10/31/2022    HGB 13.0 10/31/2022    HCT 39.9 10/31/2022    MCV 88.7 10/31/2022    RDW 13.0 10/31/2022     LFTs:   Lab Results   Component Value Date    LABPROT 7.3 05/23/2023    ALBUMIN 3.9 11/10/2023    AST 15 05/23/2023    ALT 9 05/23/2023    ALKPHOS 58 05/23/2023     FLP:   Lab Results   Component Value Date    CHOL 221 (H) 11/10/2023    HDL 70 (H) 11/10/2023    LDL " "139.00 11/10/2023    TRIG 62 11/10/2023    TOTALCHOLEST 3 11/10/2023     DM:   Lab Results   Component Value Date    HGBA1C 6.6 11/10/2023    .7 11/10/2023    CREATININE 0.72 11/10/2023    CREATRANDUR 49.8 11/06/2023    PROTEINURINE <6.8 10/31/2022     Thyroid:   Lab Results   Component Value Date    TSH 0.870 11/10/2023    [id number] 1.12 06/29/2021     Anemia: No results found for: "IRON", "TIBC", "FERRITIN", "[id number]", "FOLATE"          Assessment & Plan:     Hx of Abnormal Uterine Bleeding  -Cystic lesion of right ovary up to 4.2 cm, complex cyst, 3.2 cm calcified fibroid ultrasound back in 2016  -Seen in GYN clinic, noted that AUB was due to perimenopausal period; no longer having symptoms  -Pap smear NIL HPV negative in 2016  -Missed last GYN appt, will reschedule     Postsurgical Hypoparathyroidism  Hyperphosphatemia 2/2 Above  -Following in Endocrinology Clinic, last seen 7/22/22  -On Calcitriol 0.50 mcg qAM & Calcitriol 0.25 mcg qPM  -On Calcium Citrate 1900 mg TID  -7/22/22 Calcium of 8/7, phos 4.7  -Repeat CMP pending     Postsurgical Hypothyroidism  -Denies any symptoms of fatigue, cold intolerance, constipation  -Continued Levothyroxine 75 mcg  -Latest TSH 0.5 WNL on 7/22/22  -Endocrinology following in clinic      Type II Diabetes Mellitus  -Hgb A1c: 6.6 in Nov 2023 (previous was 6.7); repeat A1c ordered  -Urine Protein/Creatinine ratio: repeat ordered  -Annual Fundus Photo: UTD  -DM foot exam: UTD 8/8/23 and WNL; repeat annually  -Annual Lipid Screening: on atorvastatin 80  -BP < 140/90: at goal  -Medication: Continue metformin 500 mg BID      HLD  -Lipid panel (10/31/22): Chol 200, HDL 62, Tri 63,   -Had increased atorvastatin to 80 mg daily (from 40), however pt reports noncompliance with medication- has agreed to start taking regularly  -Will obtain annually new      Primary HTN  -BP above goal 152/74  -BP at home better, but still elevated  -Continue amlodipine 10 mg daily and " metoprolol succ 25 mg daily, increase HCTZ from 12.5 mg daily to 25 mg daily        Health Maintenance  -Cervical Cancer Screening: Pap smear NIL HPV negative in 2016, pt to reschedule with GYN for appt  -Breast Cancer Screening: UTD MMG/Juan Pablo Birads-1 negative 3/29/23; repeat ordered  -Colon Cancer Screening: Colonoscopy 11/20 done at outside facility, found to be negative; repeat in 10 years in 2030  -Immunizations:    -TDap: UTD    -Pneumonia: Prevnar 20 UTD 5/17/23    -Covid: UTD    -Shingles: Shingrex UTD  -HCV lifetime screening: negative  -HIV lifetime screening: negative        Follow-ups  -Follow-up medicine clinic in 6 months with labs      Bon Zapien MD  [employer] IM PGY III

## 2024-05-17 NOTE — PROGRESS NOTES
I have reviewed and concur with the resident's history, physical, assessment, and plan.  I have discussed with him all issues related to the diagnosis, workup and treatment plan. Care provided as reasonable and necessary.Post surgical hyparathyroidsim on calcitrola nd calcium citrate    Satish Haney MD  [facility]

## 2024-06-14 ENCOUNTER — OFFICE VISIT (OUTPATIENT)
Dept: ENDOCRINOLOGY | Facility: CLINIC | Age: 59
End: 2024-06-14
Payer: COMMERCIAL

## 2024-06-14 VITALS
DIASTOLIC BLOOD PRESSURE: 71 MMHG | TEMPERATURE: 98 F | RESPIRATION RATE: 16 BRPM | BODY MASS INDEX: 23.6 KG/M2 | HEIGHT: 64 IN | WEIGHT: 138.25 LBS | SYSTOLIC BLOOD PRESSURE: 134 MMHG | HEART RATE: 68 BPM

## 2024-06-14 DIAGNOSIS — E89.2 POSTSURGICAL HYPOPARATHYROIDISM: ICD-10-CM

## 2024-06-14 DIAGNOSIS — E89.0 POSTSURGICAL HYPOTHYROIDISM: Primary | ICD-10-CM

## 2024-06-14 DIAGNOSIS — E55.9 VITAMIN D DEFICIENCY: ICD-10-CM

## 2024-06-14 PROCEDURE — 99213 OFFICE O/P EST LOW 20 MIN: CPT | Mod: PBBFAC

## 2024-06-14 RX ORDER — HYDROCHLOROTHIAZIDE 25 MG/1
25 TABLET ORAL DAILY
Qty: 90 TABLET | Refills: 3 | Status: SHIPPED | OUTPATIENT
Start: 2024-06-14 | End: 2025-06-09

## 2024-06-14 RX ORDER — LEVOTHYROXINE SODIUM 75 UG/1
75 TABLET ORAL
Qty: 90 TABLET | Refills: 1 | Status: SHIPPED | OUTPATIENT
Start: 2024-06-14

## 2024-06-14 RX ORDER — BUTALB/ACETAMINOPHEN/CAFFEINE 50-325-40
1 TABLET ORAL 2 TIMES DAILY
COMMUNITY

## 2024-06-14 RX ORDER — CALCITRIOL 0.5 UG/1
0.5 CAPSULE ORAL EVERY MORNING
Qty: 90 CAPSULE | Refills: 1 | Status: SHIPPED | OUTPATIENT
Start: 2024-06-14

## 2024-06-14 RX ORDER — CALCITRIOL 0.25 UG/1
0.25 CAPSULE ORAL DAILY
Qty: 90 CAPSULE | Refills: 1 | Status: SHIPPED | OUTPATIENT
Start: 2024-06-14

## 2024-06-14 RX ORDER — METFORMIN HYDROCHLORIDE 1000 MG/1
1000 TABLET ORAL 2 TIMES DAILY
COMMUNITY
Start: 2024-05-28

## 2024-06-14 NOTE — PROGRESS NOTES
Endocrinology RESIDENT CLINIC  CLINIC NOTE    Patient Name: Addie Kelley  YOB: 1965    PRESENTING HISTORY       History of Present Illness:  Ms. Addie Kelley is a 58 y.o. female presenting to [facility] Endocrinology clinic for follow up visit. Follows us for hypothyroidism, hypoparathryoidism, and low vitamin D secondary to thyroidectomy in 2000.     She presents today and is doing well, no complaints. She's taking her medications. With regards to the Calcium Citrate, she takes 2 in AM 1 in PM. She also takes calcitriol 0.5 mcg in the morning and 0.25 mcg in the afternoon. She denies cramping, heat/cold intolerance, diarrhea/constipation, tremors. Did not complete labs prior to this visit so will do so prior to next visit. She is now also on HCTZ 12.5 QD.    ROS  As above    PAST HISTORY:     Past Medical History:   Diagnosis Date    Diabetes mellitus, type 2     Hyperlipidemia     Hypertension     Thyroid disease        Past Surgical History:   Procedure Laterality Date    THYROIDECTOMY Bilateral 2000    TUBAL LIGATION         No family history on file.    Social History     Socioeconomic History    Marital status:    Tobacco Use    Smoking status: Never    Smokeless tobacco: Never   Substance and Sexual Activity    Alcohol use: Not Currently    Drug use: Never    Sexual activity: Not Currently     Social Determinants of Health     Financial Resource Strain: Low Risk  (5/16/2024)    Overall Financial Resource Strain (CARDIA)     Difficulty of Paying Living Expenses: Not hard at all   Food Insecurity: No Food Insecurity (5/16/2024)    Hunger Vital Sign     Worried About Running Out of Food in the Last Year: Never true     Ran Out of Food in the Last Year: Never true   Transportation Needs: No Transportation Needs (11/1/2022)    PRAPARE - Transportation     Lack of Transportation (Medical): No     Lack of Transportation (Non-Medical): No   Physical Activity: Unknown (5/16/2024)    Exercise  "Vital Sign     Days of Exercise per Week: 7 days   Stress: No Stress Concern Present (5/16/2024)    [employer] [employer] - Occupational Stress Questionnaire     Feeling of Stress : Not at all   Housing Stability: Low Risk  (5/16/2024)    Housing Stability Vital Sign     Unable to Pay for Housing in the Last Year: No     Homeless in the Last Year: No       MEDICATIONS & ALLERGIES:     Current Outpatient Medications on File Prior to Visit   Medication Sig    amLODIPine (NORVASC) 10 MG tablet Take 1 tablet (10 mg total) by mouth once daily.    atorvastatin (LIPITOR) 80 MG tablet Take 1 tablet (80 mg total) by mouth once daily.    calcitRIOL (ROCALTROL) 0.25 MCG Cap Take 1 capsule (0.25 mcg total) by mouth once daily. Take in the afternoons while still taking the 0.5mcg dose in the mornings    calcitRIOL (ROCALTROL) 0.5 MCG Cap Take 1 capsule (0.5 mcg total) by mouth every morning.    calcium citrate-vitamin D3 315-200 mg (CITRACAL+D) 315 mg-5 mcg (200 unit) per tablet Take 1 tablet by mouth 2 (two) times daily.    hydroCHLOROthiazide (HYDRODIURIL) 25 MG tablet Take 1 tablet (25 mg total) by mouth once daily.    levothyroxine (SYNTHROID) 75 MCG tablet Take 1 tablet (75 mcg total) by mouth before breakfast.    metFORMIN (GLUCOPHAGE) 1000 MG tablet Take 1,000 mg by mouth 2 (two) times daily.    metoprolol succinate (TOPROL-XL) 25 MG 24 hr tablet Take 1 tablet (25 mg total) by mouth once daily.    [DISCONTINUED] metFORMIN (GLUCOPHAGE) 500 MG tablet Take 1 tablet (500 mg total) by mouth 2 (two) times daily with meals.     No current facility-administered medications on file prior to visit.       Review of patient's allergies indicates:   Allergen Reactions    Clonidine Edema       OBJECTIVE:   Vital Signs:  Vitals:    06/14/24 0834   BP: 134/71   Pulse: 68   Resp: 16   Temp: 98 °F (36.7 °C)   TempSrc: Oral   Weight: 62.7 kg (138 lb 3.7 oz)   Height: 5' 4" (1.626 m)       No results found for this or any " "previous visit (from the past 24 hour(s)).      Physical Exam  General - Appears comfortable, appropriatey conversive   Mental Status - alert and oriented x 3, speaking in logical, relevant sentences   HEENT - no rhinorrhea   Cardiac - RRR, no murmurs, rubs, or gallops; no edema in LE   Respiratory - breathing comfortably; clear to ascultation bilaterally   Abdominal - nondistended, soft, nontender to palpation   Extremities - LE, UE, and joints are nonerythematous and nonswollen   Skin - no rashes or bruises seen on skin      Laboratory  Lab Results   Component Value Date    WBC 5.6 10/31/2022    HGB 13.0 10/31/2022    HCT 39.9 10/31/2022    MCV 88.7 10/31/2022     10/31/2022     No results for input(s): "GLU", "NA", "K", "CL", "CO2", "BUN", "CREATININE", "CALCIUM", "MG" in the last 72 hours.    Lab Results   Component Value Date    INR 0.9 05/20/2021    INR 0.94 11/05/2017     Lab Results   Component Value Date    HGBA1C 6.6 11/10/2023     No results for input(s): "POCTGLUCOSE" in the last 72 hours.      ASSESSMENT & PLAN:     Post-surgical hypothyroidism  -s/p thyroidectomy 2/2 overactive thyroid per patient  -takes synthroid 75mcg daily separate from other medications or food  -TFTs ordered at last visit, not completed  -Follow up TFTs and adjust synthroid pending results    Post-surgical hypoparathyroidism  - repeat labs today  -Continue Calcitriol 0.5 mcg in AM + 0.25mcg in PM  -Continue OTC Calcium Citrate, 3 pills in the AM, down from 4 pills  -Follow up renal function panel, vit D level, 24 hr urine calcium, 24 hr urine creatinine    Vitamin D deficiency  - stable, continue calcitriol    RTC in 6 months, labs today    Skyler Jordan MD - PGY3  [employer] [facility]   "

## 2024-06-20 ENCOUNTER — LAB VISIT (OUTPATIENT)
Dept: LAB | Facility: HOSPITAL | Age: 59
End: 2024-06-20
Attending: FAMILY MEDICINE
Payer: COMMERCIAL

## 2024-06-20 ENCOUNTER — TELEPHONE (OUTPATIENT)
Dept: ENDOCRINOLOGY | Facility: CLINIC | Age: 59
End: 2024-06-20
Payer: COMMERCIAL

## 2024-06-20 DIAGNOSIS — E11.9 TYPE 2 DIABETES MELLITUS WITHOUT COMPLICATION, WITHOUT LONG-TERM CURRENT USE OF INSULIN: ICD-10-CM

## 2024-06-20 DIAGNOSIS — E89.0 POSTSURGICAL HYPOTHYROIDISM: ICD-10-CM

## 2024-06-20 DIAGNOSIS — E89.2 POSTSURGICAL HYPOPARATHYROIDISM: ICD-10-CM

## 2024-06-20 DIAGNOSIS — E78.2 MIXED HYPERLIPIDEMIA: ICD-10-CM

## 2024-06-20 DIAGNOSIS — E89.2 POSTSURGICAL HYPOPARATHYROIDISM: Primary | ICD-10-CM

## 2024-06-20 LAB
25(OH)D3+25(OH)D2 SERPL-MCNC: 56 NG/ML (ref 30–80)
ALBUMIN SERPL-MCNC: 3.8 G/DL (ref 3.5–5)
ALBUMIN/GLOB SERPL: 1.1 RATIO (ref 1.1–2)
ALP SERPL-CCNC: 67 UNIT/L (ref 40–150)
ALT SERPL-CCNC: 15 UNIT/L (ref 0–55)
ANION GAP SERPL CALC-SCNC: 8 MEQ/L
AST SERPL-CCNC: 20 UNIT/L (ref 5–34)
BASOPHILS # BLD AUTO: 0.02 X10(3)/MCL
BASOPHILS NFR BLD AUTO: 0.4 %
BILIRUB SERPL-MCNC: 0.3 MG/DL
BUN SERPL-MCNC: 17.1 MG/DL (ref 9.8–20.1)
CALCIUM SERPL-MCNC: 8.8 MG/DL (ref 8.4–10.2)
CHLORIDE SERPL-SCNC: 103 MMOL/L (ref 98–107)
CHOLEST SERPL-MCNC: 181 MG/DL
CHOLEST/HDLC SERPL: 3 {RATIO} (ref 0–5)
CO2 SERPL-SCNC: 29 MMOL/L (ref 22–29)
CREAT SERPL-MCNC: 0.82 MG/DL (ref 0.55–1.02)
CREAT/UREA NIT SERPL: 21
EOSINOPHIL # BLD AUTO: 0.05 X10(3)/MCL (ref 0–0.9)
EOSINOPHIL NFR BLD AUTO: 1 %
ERYTHROCYTE [DISTWIDTH] IN BLOOD BY AUTOMATED COUNT: 13.2 % (ref 11.5–17)
EST. AVERAGE GLUCOSE BLD GHB EST-MCNC: 139.9 MG/DL
GFR SERPLBLD CREATININE-BSD FMLA CKD-EPI: >60 ML/MIN/1.73/M2
GLOBULIN SER-MCNC: 3.4 GM/DL (ref 2.4–3.5)
GLUCOSE SERPL-MCNC: 162 MG/DL (ref 74–100)
HBA1C MFR BLD: 6.5 %
HCT VFR BLD AUTO: 38.7 % (ref 37–47)
HDLC SERPL-MCNC: 67 MG/DL (ref 35–60)
HGB BLD-MCNC: 13.1 G/DL (ref 12–16)
IMM GRANULOCYTES # BLD AUTO: 0.01 X10(3)/MCL (ref 0–0.04)
IMM GRANULOCYTES NFR BLD AUTO: 0.2 %
LDLC SERPL CALC-MCNC: 100 MG/DL (ref 50–140)
LYMPHOCYTES # BLD AUTO: 2.4 X10(3)/MCL (ref 0.6–4.6)
LYMPHOCYTES NFR BLD AUTO: 48.9 %
MCH RBC QN AUTO: 30.3 PG (ref 27–31)
MCHC RBC AUTO-ENTMCNC: 33.9 G/DL (ref 33–36)
MCV RBC AUTO: 89.6 FL (ref 80–94)
MONOCYTES # BLD AUTO: 0.36 X10(3)/MCL (ref 0.1–1.3)
MONOCYTES NFR BLD AUTO: 7.3 %
NEUTROPHILS # BLD AUTO: 2.07 X10(3)/MCL (ref 2.1–9.2)
NEUTROPHILS NFR BLD AUTO: 42.2 %
NRBC BLD AUTO-RTO: 0 %
PHOSPHATE SERPL-MCNC: 5.2 MG/DL (ref 2.3–4.7)
PLATELET # BLD AUTO: 247 X10(3)/MCL (ref 130–400)
PMV BLD AUTO: 11.5 FL (ref 7.4–10.4)
POTASSIUM SERPL-SCNC: 4.1 MMOL/L (ref 3.5–5.1)
PROT SERPL-MCNC: 7.2 GM/DL (ref 6.4–8.3)
RBC # BLD AUTO: 4.32 X10(6)/MCL (ref 4.2–5.4)
SODIUM SERPL-SCNC: 140 MMOL/L (ref 136–145)
TRIGL SERPL-MCNC: 68 MG/DL (ref 37–140)
TSH SERPL-ACNC: 0.46 UIU/ML (ref 0.35–4.94)
VLDLC SERPL CALC-MCNC: 14 MG/DL
WBC # BLD AUTO: 4.91 X10(3)/MCL (ref 4.5–11.5)

## 2024-06-20 PROCEDURE — 36415 COLL VENOUS BLD VENIPUNCTURE: CPT

## 2024-06-20 PROCEDURE — 80053 COMPREHEN METABOLIC PANEL: CPT

## 2024-06-20 PROCEDURE — 85025 COMPLETE CBC W/AUTO DIFF WBC: CPT

## 2024-06-20 PROCEDURE — 80061 LIPID PANEL: CPT

## 2024-06-20 PROCEDURE — 84100 ASSAY OF PHOSPHORUS: CPT

## 2024-06-20 PROCEDURE — 83036 HEMOGLOBIN GLYCOSYLATED A1C: CPT

## 2024-06-20 PROCEDURE — 84443 ASSAY THYROID STIM HORMONE: CPT

## 2024-06-20 PROCEDURE — 82306 VITAMIN D 25 HYDROXY: CPT

## 2024-06-20 NOTE — TELEPHONE ENCOUNTER
Labs are back.    Phos and calcium are up.  Thyroid is at goal and vit d wnl.    Continue current dose of LT4 and calcium.    Would hold off on collecting the 24 hour urine.  Would instead reduce calcitriol from 0.5mcg in am and 0.25mcg in pm to 0.25mcg po BID.      Repeat renal panel in a week.

## 2024-07-08 ENCOUNTER — LAB VISIT (OUTPATIENT)
Dept: LAB | Facility: HOSPITAL | Age: 59
End: 2024-07-08
Attending: INTERNAL MEDICINE
Payer: COMMERCIAL

## 2024-07-08 DIAGNOSIS — E89.2 POSTSURGICAL HYPOPARATHYROIDISM: ICD-10-CM

## 2024-07-08 LAB
ALBUMIN SERPL-MCNC: 3.6 G/DL (ref 3.5–5)
BUN SERPL-MCNC: 15.9 MG/DL (ref 9.8–20.1)
CALCIUM SERPL-MCNC: 8 MG/DL (ref 8.4–10.2)
CHLORIDE SERPL-SCNC: 103 MMOL/L (ref 98–107)
CO2 SERPL-SCNC: 28 MMOL/L (ref 22–29)
CREAT SERPL-MCNC: 0.82 MG/DL (ref 0.55–1.02)
GFR SERPLBLD CREATININE-BSD FMLA CKD-EPI: >60 ML/MIN/1.73/M2
GLUCOSE SERPL-MCNC: 163 MG/DL (ref 74–100)
PHOSPHATE SERPL-MCNC: 5.4 MG/DL (ref 2.3–4.7)
POTASSIUM SERPL-SCNC: 4.2 MMOL/L (ref 3.5–5.1)
SODIUM SERPL-SCNC: 143 MMOL/L (ref 136–145)

## 2024-07-08 PROCEDURE — 80069 RENAL FUNCTION PANEL: CPT

## 2024-07-08 PROCEDURE — 36415 COLL VENOUS BLD VENIPUNCTURE: CPT

## 2024-11-25 ENCOUNTER — OFFICE VISIT (OUTPATIENT)
Dept: INTERNAL MEDICINE | Facility: CLINIC | Age: 59
End: 2024-11-25
Payer: COMMERCIAL

## 2024-11-25 VITALS
SYSTOLIC BLOOD PRESSURE: 130 MMHG | BODY MASS INDEX: 24.55 KG/M2 | DIASTOLIC BLOOD PRESSURE: 72 MMHG | RESPIRATION RATE: 18 BRPM | OXYGEN SATURATION: 100 % | HEART RATE: 92 BPM | TEMPERATURE: 99 F | HEIGHT: 64 IN | WEIGHT: 143.81 LBS

## 2024-11-25 DIAGNOSIS — E11.9 TYPE 2 DIABETES MELLITUS WITHOUT COMPLICATION, WITHOUT LONG-TERM CURRENT USE OF INSULIN: Primary | ICD-10-CM

## 2024-11-25 DIAGNOSIS — H66.92 LEFT OTITIS MEDIA, UNSPECIFIED OTITIS MEDIA TYPE: ICD-10-CM

## 2024-11-25 DIAGNOSIS — E89.2 POSTSURGICAL HYPOPARATHYROIDISM: ICD-10-CM

## 2024-11-25 DIAGNOSIS — I10 PRIMARY HYPERTENSION: ICD-10-CM

## 2024-11-25 DIAGNOSIS — Z12.4 CERVICAL CANCER SCREENING: ICD-10-CM

## 2024-11-25 DIAGNOSIS — E78.2 MIXED HYPERLIPIDEMIA: ICD-10-CM

## 2024-11-25 LAB — HBA1C MFR BLD: 6.7 %

## 2024-11-25 PROCEDURE — 83036 HEMOGLOBIN GLYCOSYLATED A1C: CPT | Mod: PBBFAC

## 2024-11-25 PROCEDURE — 99215 OFFICE O/P EST HI 40 MIN: CPT | Mod: PBBFAC

## 2024-11-25 RX ORDER — METOPROLOL SUCCINATE 25 MG/1
25 TABLET, EXTENDED RELEASE ORAL DAILY
Qty: 90 TABLET | Refills: 3 | Status: SHIPPED | OUTPATIENT
Start: 2024-11-25

## 2024-11-25 RX ORDER — ATORVASTATIN CALCIUM 80 MG/1
80 TABLET, FILM COATED ORAL DAILY
Qty: 30 TABLET | Refills: 3 | Status: SHIPPED | OUTPATIENT
Start: 2024-11-25

## 2024-11-25 RX ORDER — AMOXICILLIN AND CLAVULANATE POTASSIUM 875; 125 MG/1; MG/1
1 TABLET, FILM COATED ORAL EVERY 12 HOURS
Qty: 10 TABLET | Refills: 0 | Status: SHIPPED | OUTPATIENT
Start: 2024-11-25 | End: 2024-11-30

## 2024-11-25 RX ORDER — CALCITRIOL 0.5 UG/1
0.5 CAPSULE ORAL EVERY MORNING
Qty: 90 CAPSULE | Refills: 1 | Status: SHIPPED | OUTPATIENT
Start: 2024-11-25

## 2024-11-25 RX ORDER — HYDROCHLOROTHIAZIDE 25 MG/1
25 TABLET ORAL DAILY
Qty: 90 TABLET | Refills: 3 | Status: SHIPPED | OUTPATIENT
Start: 2024-11-25 | End: 2025-11-20

## 2024-11-25 RX ORDER — METFORMIN HYDROCHLORIDE 1000 MG/1
1000 TABLET ORAL 2 TIMES DAILY
Qty: 60 TABLET | Refills: 4 | Status: SHIPPED | OUTPATIENT
Start: 2024-11-25

## 2024-11-25 RX ORDER — AMLODIPINE BESYLATE 10 MG/1
10 TABLET ORAL DAILY
Qty: 90 EACH | Refills: 3 | Status: SHIPPED | OUTPATIENT
Start: 2024-11-25

## 2024-11-25 NOTE — PROGRESS NOTES
"U Internal Medicine Clinic Visit    Chief Complaint:      Otalgia (C/o intermittent pain to left ear) and Hypertension (HTN f/u)     Subjective:     HPI:  Ms. Kelley is a 57 year old female with PMH of hypothyroidism 2/2 thyroidectomy, postsurgical hypoparathyroidism, HTN, HLD, DM Type II here for clinic f/u.    Continues to follow Endocrinology. She c/o left ear pain for 2-3 days, no drainage, throbbing in nature, got OTC wax removal but no relief. No issues with right ear. Denies any fever or hearing loss. Endorses mild headache.     Denies any more AUB. No more menstrual cycle since age 55.     Denies chest pain, palpitations, n/v, cough, SOB, abdominal pain, diarrhea, melena, hematochezia, dysuria, tingling/numbness, or rash.     Review of Systems  12 point ROS negative unless mentioned above    Objective:   Last 24 Hour Vital Signs:  Vitals  BP: 130/72  Temp: 98.8 °F (37.1 °C)  Temp Source: Oral  Pulse: 92  Resp: 18  SpO2: 100 %  Height: 5' 4" (162.6 cm)  Weight: 65.2 kg (143 lb 12.8 oz)    Physical Examination:    General: Well nourished, well developed in NAD  HEENT: PERRL, NC/AT, erythematous left auditory canal with pain on manipulation but no cerumen, pus or drainage noted. Right ear wnl  Respiratory: CTAB, normal respiratory effort  Cardiovascular: RRR, no m/r/g  Gastrointestinal: Soft, non-tender, non-distended, active BS, no masses palpable  Musculoskeletal: full range of motion of all extremities/spine without limitation or discomfort  Integumentary: multiple overgrown toenails b/l feet  Neurologic: AAOx4, CN II-XII grossly intact, normal gait      Protective Sensation (w/ 10 gram monofilament):  Right: Intact  Left: Intact    Visual Inspection:  Onychomycosis -  Bilateral    Pedal Pulses:   Right: Present  Left: Present    Posterior Tibialis Pulses:   Right:Present  Left: Present      Labs  BMP:   Lab Results   Component Value Date    CO2 28 07/08/2024    BUN 15.9 07/08/2024    CREATININE 0.82 " "07/08/2024    GLUCOSE 163 (H) 07/08/2024    CALCIUM 8.0 (L) 07/08/2024     CBC:   Lab Results   Component Value Date    WBC 4.91 06/20/2024    HGB 13.1 06/20/2024    HCT 38.7 06/20/2024    MCV 89.6 06/20/2024    RDW 13.2 06/20/2024     LFTs:   Lab Results   Component Value Date    LABPROT 7.2 06/20/2024    ALBUMIN 3.6 07/08/2024    AST 20 06/20/2024    ALT 15 06/20/2024    ALKPHOS 67 06/20/2024     FLP:   Lab Results   Component Value Date    CHOL 181 06/20/2024    HDL 67 (H) 06/20/2024    .00 06/20/2024    TRIG 68 06/20/2024    TOTALCHOLEST 3 06/20/2024     DM:   Lab Results   Component Value Date    HGBA1C 6.5 06/20/2024    .9 06/20/2024    CREATININE 0.82 07/08/2024    CREATRANDUR 49.8 11/06/2023    PROTEINURINE <6.8 10/31/2022     Thyroid:   Lab Results   Component Value Date    TSH 0.456 06/20/2024    [id number] 1.12 06/29/2021     Anemia: No results found for: "IRON", "TIBC", "FERRITIN", "[id number]", "FOLATE"          Assessment & Plan:     Hx of Abnormal Uterine Bleeding  -Cystic lesion of right ovary up to 4.2 cm, complex cyst, 3.2 cm calcified fibroid ultrasound back in 2016  -Seen in GYN clinic, noted that AUB was due to perimenopausal period; no longer having symptoms  -Pap smear NIL HPV negative in 2016  -will reschedule GYN appt.      Postsurgical Hypoparathyroidism  Hyperphosphatemia 2/2 Above  -Following in Endocrinology Clinic, last seen 6/14/24  - Patient taking  Calcitriol 0.5 mcg in AM only  -Continue OTC Calcium Citrate, 3 pills in the AM, down from 4 pills     Postsurgical Hypothyroidism  -s/p thyroidectomy 2/2 overactive thyroid per patient  -takes synthroid 75mcg daily separate from other medications or food  - last TSH wnl  -Following in Endocrinology Clinic, last seen 6/14/24      Type II Diabetes Mellitus  -POCT A1C today 6.7  -Urine Protein/Creatinine ratio: repeat today  -DM eye eam: recommend today  -DM foot exam: performed today  -Annual Lipid Screening: on atorvastatin " 80  -BP < 140/90: at goal  -Medication: Continue metformin 1000 mg BID      HLD  -Lipid panel (6/20/24): Chol 181, HDL 67, Tri 68,   - Continue Atorvastatin 80 mg daily  - repeat lab annually      Primary HTN  -well controlled  -Continue amlodipine 10 mg daily and metoprolol succ 25 mg daily, HCTZ 25 mg daily    Left ear pain likely otitis media  - order Augmentin 875 mg for 5 days        Health Maintenance  -Cervical Cancer Screening: Pap smear NIL HPV negative in 2016, pt to reschedule with GYN for appt  -Breast Cancer Screening: recommend today  -Colon Cancer Screening: Colonoscopy 11/2020 done at outside facility, found to be negative; repeat in 10 years in 2030  -Immunizations:    -TDap: UTD    -Pneumonia: Prevnar 20 UTD 5/17/23    -Covid: UTD    -Shingles: Shingrex UTD  -HCV lifetime screening: negative  -HIV lifetime screening: negative        Labs: POCT A1C, urine Protein/Microalbumin ratio  Imaging: mammogram, DM eye ecam    RTC 6 months    Kurt Hanna MD  Internal Medicine - PGY-2

## 2024-11-29 NOTE — PROGRESS NOTES
Attending Addendum:   Patient seen and examined in clinic. Management and Plan were discussed with resident. Care was reasonable and necessary.   Risa Perez MD  [facility] - Internal Medicine

## 2025-01-10 ENCOUNTER — OFFICE VISIT (OUTPATIENT)
Dept: ENDOCRINOLOGY | Facility: CLINIC | Age: 60
End: 2025-01-10
Payer: MEDICAID

## 2025-01-10 VITALS
WEIGHT: 146.19 LBS | SYSTOLIC BLOOD PRESSURE: 116 MMHG | RESPIRATION RATE: 14 BRPM | BODY MASS INDEX: 24.96 KG/M2 | HEIGHT: 64 IN | TEMPERATURE: 98 F | HEART RATE: 73 BPM | DIASTOLIC BLOOD PRESSURE: 74 MMHG

## 2025-01-10 DIAGNOSIS — E89.2 POSTSURGICAL HYPOPARATHYROIDISM: ICD-10-CM

## 2025-01-10 DIAGNOSIS — E55.9 VITAMIN D DEFICIENCY: ICD-10-CM

## 2025-01-10 DIAGNOSIS — E89.0 POSTSURGICAL HYPOTHYROIDISM: Primary | ICD-10-CM

## 2025-01-10 PROCEDURE — 99214 OFFICE O/P EST MOD 30 MIN: CPT | Mod: PBBFAC

## 2025-01-10 RX ORDER — BUTALB/ACETAMINOPHEN/CAFFEINE 50-325-40
1 TABLET ORAL 2 TIMES DAILY
Qty: 90 TABLET | Refills: 3 | Status: SHIPPED | OUTPATIENT
Start: 2025-01-10

## 2025-01-10 RX ORDER — HYDROCHLOROTHIAZIDE 25 MG/1
25 TABLET ORAL DAILY
Qty: 90 TABLET | Refills: 3 | Status: SHIPPED | OUTPATIENT
Start: 2025-01-10 | End: 2026-01-05

## 2025-01-10 RX ORDER — CALCITRIOL 0.5 UG/1
0.5 CAPSULE ORAL EVERY MORNING
Qty: 90 CAPSULE | Refills: 1 | Status: SHIPPED | OUTPATIENT
Start: 2025-01-10

## 2025-01-10 RX ORDER — LEVOTHYROXINE SODIUM 75 UG/1
75 TABLET ORAL
Qty: 90 TABLET | Refills: 1 | Status: SHIPPED | OUTPATIENT
Start: 2025-01-10

## 2025-01-10 NOTE — PROGRESS NOTES
Endocrinology RESIDENT CLINIC  CLINIC NOTE    Patient Name: Addie Kelley  YOB: 1965    PRESENTING HISTORY       History of Present Illness:  Ms. Addie Kelley is a 59 y.o. female presenting to [facility] Endocrinology clinic for follow up visit. Follows us for hypothyroidism, hypoparathryoidism, and low vitamin D secondary to thyroidectomy in 2000.     She presents today and is doing well, no complaints. She's taking her medications. With regards to the Calcium Citrate, she takes 2 in AM 1 in PM. She also takes calcitriol 0.5 mcg in the morning .. She denies cramping, heat/cold intolerance, diarrhea/constipation, tremors. Did not complete labs prior to this visit so will do so prior to next visit. She is now also on HCTZ 25  QD.    ROS  As above    PAST HISTORY:     Past Medical History:   Diagnosis Date    Diabetes mellitus, type 2     Hyperlipidemia     Hypertension     Thyroid disease        Past Surgical History:   Procedure Laterality Date    THYROIDECTOMY Bilateral 2000    TUBAL LIGATION         Family History   Problem Relation Name Age of Onset    Hypertension Mother      Thyroid disease Mother      Hypertension Father      No Known Problems Brother         Social History     Socioeconomic History    Marital status:    Tobacco Use    Smoking status: Never    Smokeless tobacco: Never   Substance and Sexual Activity    Alcohol use: Not Currently    Drug use: Never    Sexual activity: Not Currently     Partners: Male     Social Drivers of Health     Financial Resource Strain: Low Risk  (5/16/2024)    Overall Financial Resource Strain (CARDIA)     Difficulty of Paying Living Expenses: Not hard at all   Food Insecurity: No Food Insecurity (5/16/2024)    Hunger Vital Sign     Worried About Running Out of Food in the Last Year: Never true     Ran Out of Food in the Last Year: Never true   Transportation Needs: No Transportation Needs (11/1/2022)    PRAPARE - Transportation     Lack of  "Transportation (Medical): No     Lack of Transportation (Non-Medical): No   Physical Activity: Unknown (5/16/2024)    Exercise Vital Sign     Days of Exercise per Week: 7 days   Stress: No Stress Concern Present (5/16/2024)    [employer] - Occupational Stress Questionnaire     Feeling of Stress : Not at all   Housing Stability: Low Risk  (5/16/2024)    Housing Stability Vital Sign     Unable to Pay for Housing in the Last Year: No     Homeless in the Last Year: No       MEDICATIONS & ALLERGIES:     Current Outpatient Medications on File Prior to Visit   Medication Sig    amLODIPine (NORVASC) 10 MG tablet Take 1 tablet (10 mg total) by mouth once daily.    atorvastatin (LIPITOR) 80 MG tablet Take 1 tablet (80 mg total) by mouth once daily.    calcitRIOL (ROCALTROL) 0.5 MCG Cap Take 1 capsule (0.5 mcg total) by mouth every morning.    calcium citrate-vitamin D3 315-200 mg (CITRACAL+D) 315 mg-5 mcg (200 unit) per tablet Take 1 tablet by mouth 2 (two) times daily.    hydroCHLOROthiazide (HYDRODIURIL) 25 MG tablet Take 1 tablet (25 mg total) by mouth once daily.    levothyroxine (SYNTHROID) 75 MCG tablet Take 1 tablet (75 mcg total) by mouth before breakfast.    metFORMIN (GLUCOPHAGE) 1000 MG tablet Take 1 tablet (1,000 mg total) by mouth 2 (two) times daily.    metoprolol succinate (TOPROL-XL) 25 MG 24 hr tablet Take 1 tablet (25 mg total) by mouth once daily.     No current facility-administered medications on file prior to visit.       Review of patient's allergies indicates:   Allergen Reactions    Clonidine Edema       OBJECTIVE:   Vital Signs:  Vitals:    01/10/25 0759   BP: 116/74   Pulse: 73   Resp: 14   Temp: 98.2 °F (36.8 °C)   TempSrc: Oral   Weight: 66.3 kg (146 lb 2.6 oz)   Height: 5' 4" (1.626 m)         No results found for this or any previous visit (from the past 24 hours).      Physical Exam  General - Appears comfortable, appropriatey conversive   Mental Status - alert and " "oriented x 3, speaking in logical, relevant sentences   HEENT - no rhinorrhea   Cardiac - RRR, no murmurs, rubs, or gallops; no edema in LE   Respiratory - breathing comfortably; clear to ascultation bilaterally   Abdominal - nondistended, soft, nontender to palpation   Extremities - LE, UE, and joints are nonerythematous and nonswollen   Skin - no rashes or bruises seen on skin      Laboratory  Lab Results   Component Value Date    WBC 4.91 06/20/2024    HGB 13.1 06/20/2024    HCT 38.7 06/20/2024    MCV 89.6 06/20/2024     06/20/2024     No results for input(s): "GLU", "NA", "K", "CL", "CO2", "BUN", "CREATININE", "CALCIUM", "MG" in the last 72 hours.    Lab Results   Component Value Date    INR 0.9 05/20/2021    INR 0.94 11/05/2017     Lab Results   Component Value Date    HGBA1C 6.5 06/20/2024     No results for input(s): "POCTGLUCOSE" in the last 72 hours.    Current Outpatient Medications   Medication Instructions    amLODIPine (NORVASC) 10 mg, Oral, Daily    atorvastatin (LIPITOR) 80 mg, Oral, Daily    calcitRIOL (ROCALTROL) 0.5 mcg, Oral, Every morning    calcium citrate-vitamin D3 315-200 mg (CITRACAL+D) 315 mg-5 mcg (200 unit) per tablet 1 tablet, 2 times daily    hydroCHLOROthiazide (HYDRODIURIL) 25 mg, Oral, Daily    levothyroxine (SYNTHROID) 75 mcg, Oral, Before breakfast    metFORMIN (GLUCOPHAGE) 1,000 mg, Oral, 2 times daily    metoprolol succinate (TOPROL-XL) 25 mg, Oral, Daily         ASSESSMENT & PLAN:     Post-surgical hypothyroidism  -s/p thyroidectomy 2/2 overactive thyroid per patient  -takes synthroid 75mcg daily separate from other medications or food  -TFTs wnl last visit.   -Follow up TFTs and adjust synthroid pending results    Post-surgical hypoparathyroidism  Mild hypocalcemia (asymptomatic)  -Continue Calcitriol 0.5 mcg in AM.   -Continue OTC Calcium Citrate, 2 pill in the AM, 1 in the PM.    - also on hctz 25 mg qd may impact calcium loads.   - last calcium corrected for albumin = " 8.2, Phos 5.4.        Vitamin D deficiency  (resolved)  - wnl, continue above medications.   Has been replete since June 2017.     Type 2 DM  Metformin 1000 mg bid  Handout printed  A1c 11/25/24 - 6.7     RTC in 6 months, labs today. Meds refilled.     Dilan Riojas DO  [facility] Internal Medicine, -III

## 2025-01-13 ENCOUNTER — LAB VISIT (OUTPATIENT)
Dept: LAB | Facility: HOSPITAL | Age: 60
End: 2025-01-13
Payer: MEDICAID

## 2025-01-13 ENCOUNTER — TELEPHONE (OUTPATIENT)
Dept: ENDOCRINOLOGY | Facility: CLINIC | Age: 60
End: 2025-01-13
Payer: MEDICAID

## 2025-01-13 DIAGNOSIS — E89.0 POSTSURGICAL HYPOTHYROIDISM: ICD-10-CM

## 2025-01-13 DIAGNOSIS — E55.9 VITAMIN D DEFICIENCY: ICD-10-CM

## 2025-01-13 DIAGNOSIS — E89.2 POSTSURGICAL HYPOPARATHYROIDISM: ICD-10-CM

## 2025-01-13 DIAGNOSIS — E89.2 POSTSURGICAL HYPOPARATHYROIDISM: Primary | ICD-10-CM

## 2025-01-13 LAB
25(OH)D3+25(OH)D2 SERPL-MCNC: 54 NG/ML (ref 30–80)
ALBUMIN SERPL-MCNC: 3.8 G/DL (ref 3.4–4.8)
BUN SERPL-MCNC: 18.9 MG/DL (ref 9.8–20.1)
CALCIUM SERPL-MCNC: 8.5 MG/DL (ref 8.4–10.2)
CHLORIDE SERPL-SCNC: 101 MMOL/L (ref 98–107)
CO2 SERPL-SCNC: 29 MMOL/L (ref 23–31)
CREAT SERPL-MCNC: 0.71 MG/DL (ref 0.55–1.02)
GFR SERPLBLD CREATININE-BSD FMLA CKD-EPI: >60 ML/MIN/1.73/M2
GLUCOSE SERPL-MCNC: 157 MG/DL (ref 82–115)
PHOSPHATE SERPL-MCNC: 4.3 MG/DL (ref 2.3–4.7)
POTASSIUM SERPL-SCNC: 3.7 MMOL/L (ref 3.5–5.1)
SODIUM SERPL-SCNC: 141 MMOL/L (ref 136–145)
TSH SERPL-ACNC: 0.63 UIU/ML (ref 0.35–4.94)

## 2025-01-13 PROCEDURE — 36415 COLL VENOUS BLD VENIPUNCTURE: CPT

## 2025-01-13 PROCEDURE — 80069 RENAL FUNCTION PANEL: CPT

## 2025-01-13 PROCEDURE — 82306 VITAMIN D 25 HYDROXY: CPT

## 2025-01-13 PROCEDURE — 84443 ASSAY THYROID STIM HORMONE: CPT

## 2025-01-13 NOTE — TELEPHONE ENCOUNTER
Labs are back and acceptable, continue current regimen for thyroid and parathyroid d/s.    Plan for labs prior to f/u including TSH, renal panel, 24 hour urine calcium and creatinine.

## 2025-03-24 DIAGNOSIS — E89.0 POSTSURGICAL HYPOTHYROIDISM: Primary | ICD-10-CM

## 2025-03-24 RX ORDER — LEVOTHYROXINE SODIUM 75 UG/1
75 TABLET ORAL
Qty: 30 TABLET | Refills: 11 | Status: SHIPPED | OUTPATIENT
Start: 2025-03-24 | End: 2026-03-24

## 2025-06-09 ENCOUNTER — OFFICE VISIT (OUTPATIENT)
Dept: INTERNAL MEDICINE | Facility: CLINIC | Age: 60
End: 2025-06-09
Payer: MEDICAID

## 2025-06-09 ENCOUNTER — LAB VISIT (OUTPATIENT)
Dept: LAB | Facility: HOSPITAL | Age: 60
End: 2025-06-09
Payer: MEDICAID

## 2025-06-09 ENCOUNTER — CLINICAL SUPPORT (OUTPATIENT)
Dept: INTERNAL MEDICINE | Facility: CLINIC | Age: 60
End: 2025-06-09
Payer: MEDICAID

## 2025-06-09 VITALS
BODY MASS INDEX: 26.77 KG/M2 | WEIGHT: 156.81 LBS | SYSTOLIC BLOOD PRESSURE: 136 MMHG | TEMPERATURE: 98 F | HEIGHT: 64 IN | OXYGEN SATURATION: 98 % | DIASTOLIC BLOOD PRESSURE: 70 MMHG | RESPIRATION RATE: 18 BRPM | HEART RATE: 65 BPM

## 2025-06-09 DIAGNOSIS — E11.9 TYPE 2 DIABETES MELLITUS WITHOUT COMPLICATION, WITHOUT LONG-TERM CURRENT USE OF INSULIN: ICD-10-CM

## 2025-06-09 DIAGNOSIS — Z12.31 ENCOUNTER FOR SCREENING MAMMOGRAM FOR MALIGNANT NEOPLASM OF BREAST: ICD-10-CM

## 2025-06-09 DIAGNOSIS — I10 PRIMARY HYPERTENSION: ICD-10-CM

## 2025-06-09 DIAGNOSIS — E78.2 MIXED HYPERLIPIDEMIA: ICD-10-CM

## 2025-06-09 DIAGNOSIS — E55.9 VITAMIN D DEFICIENCY: ICD-10-CM

## 2025-06-09 DIAGNOSIS — E11.9 TYPE 2 DIABETES MELLITUS WITHOUT COMPLICATION, WITHOUT LONG-TERM CURRENT USE OF INSULIN: Primary | ICD-10-CM

## 2025-06-09 DIAGNOSIS — Z12.4 CERVICAL CANCER SCREENING: ICD-10-CM

## 2025-06-09 DIAGNOSIS — E89.2 POSTSURGICAL HYPOPARATHYROIDISM: ICD-10-CM

## 2025-06-09 LAB
ALBUMIN SERPL-MCNC: 4.1 G/DL (ref 3.4–4.8)
ALBUMIN/GLOB SERPL: 0.9 RATIO (ref 1.1–2)
ALP SERPL-CCNC: 66 UNIT/L (ref 40–150)
ALT SERPL-CCNC: 16 UNIT/L (ref 0–55)
ANION GAP SERPL CALC-SCNC: 14 MEQ/L
AST SERPL-CCNC: 25 UNIT/L (ref 11–45)
BACTERIA #/AREA URNS AUTO: ABNORMAL /HPF
BASOPHILS # BLD AUTO: 0.03 X10(3)/MCL
BASOPHILS NFR BLD AUTO: 0.6 %
BILIRUB SERPL-MCNC: 0.5 MG/DL
BILIRUB UR QL STRIP.AUTO: NEGATIVE
BUN SERPL-MCNC: 18.9 MG/DL (ref 9.8–20.1)
CALCIUM SERPL-MCNC: 8.7 MG/DL (ref 8.4–10.2)
CHLORIDE SERPL-SCNC: 104 MMOL/L (ref 98–107)
CHOLEST SERPL-MCNC: 219 MG/DL
CHOLEST/HDLC SERPL: 3 {RATIO} (ref 0–5)
CLARITY UR: CLEAR
CO2 SERPL-SCNC: 24 MMOL/L (ref 23–31)
COLOR UR AUTO: COLORLESS
CREAT SERPL-MCNC: 0.77 MG/DL (ref 0.55–1.02)
CREAT UR-MCNC: 39.8 MG/DL (ref 45–106)
CREAT/UREA NIT SERPL: 25
EOSINOPHIL # BLD AUTO: 0.03 X10(3)/MCL (ref 0–0.9)
EOSINOPHIL NFR BLD AUTO: 0.6 %
ERYTHROCYTE [DISTWIDTH] IN BLOOD BY AUTOMATED COUNT: 12.7 % (ref 11.5–17)
GFR SERPLBLD CREATININE-BSD FMLA CKD-EPI: >60 ML/MIN/1.73/M2
GLOBULIN SER-MCNC: 4.5 GM/DL (ref 2.4–3.5)
GLUCOSE SERPL-MCNC: 113 MG/DL (ref 82–115)
GLUCOSE UR QL STRIP: NORMAL
HBA1C MFR BLD: 8.1 %
HCT VFR BLD AUTO: 40.6 % (ref 37–47)
HDLC SERPL-MCNC: 75 MG/DL (ref 35–60)
HGB BLD-MCNC: 13.2 G/DL (ref 12–16)
HGB UR QL STRIP: NEGATIVE
HYALINE CASTS #/AREA URNS LPF: ABNORMAL /LPF
IMM GRANULOCYTES # BLD AUTO: 0.02 X10(3)/MCL (ref 0–0.04)
IMM GRANULOCYTES NFR BLD AUTO: 0.4 %
KETONES UR QL STRIP: NEGATIVE
LDLC SERPL CALC-MCNC: 120 MG/DL (ref 50–140)
LEUKOCYTE ESTERASE UR QL STRIP: 250
LYMPHOCYTES # BLD AUTO: 2.29 X10(3)/MCL (ref 0.6–4.6)
LYMPHOCYTES NFR BLD AUTO: 43.1 %
MCH RBC QN AUTO: 29.5 PG (ref 27–31)
MCHC RBC AUTO-ENTMCNC: 32.5 G/DL (ref 33–36)
MCV RBC AUTO: 90.6 FL (ref 80–94)
MICROALBUMIN UR-MCNC: <5 UG/ML
MICROALBUMIN/CREAT RATIO PNL UR: ABNORMAL
MONOCYTES # BLD AUTO: 0.37 X10(3)/MCL (ref 0.1–1.3)
MONOCYTES NFR BLD AUTO: 7 %
NEUTROPHILS # BLD AUTO: 2.57 X10(3)/MCL (ref 2.1–9.2)
NEUTROPHILS NFR BLD AUTO: 48.3 %
NITRITE UR QL STRIP: NEGATIVE
NRBC BLD AUTO-RTO: 0 %
PH UR STRIP: 6.5 [PH]
PLATELET # BLD AUTO: 256 X10(3)/MCL (ref 130–400)
PMV BLD AUTO: 11.2 FL (ref 7.4–10.4)
POTASSIUM SERPL-SCNC: 4.4 MMOL/L (ref 3.5–5.1)
PROT SERPL-MCNC: 8.6 GM/DL (ref 5.8–7.6)
PROT UR QL STRIP: NEGATIVE
RBC # BLD AUTO: 4.48 X10(6)/MCL (ref 4.2–5.4)
RBC #/AREA URNS AUTO: ABNORMAL /HPF
SODIUM SERPL-SCNC: 142 MMOL/L (ref 136–145)
SP GR UR STRIP.AUTO: 1.01 (ref 1–1.03)
SQUAMOUS #/AREA URNS LPF: ABNORMAL /HPF
TRIGL SERPL-MCNC: 118 MG/DL (ref 37–140)
UROBILINOGEN UR STRIP-ACNC: NORMAL
VLDLC SERPL CALC-MCNC: 24 MG/DL
WBC # BLD AUTO: 5.31 X10(3)/MCL (ref 4.5–11.5)
WBC #/AREA URNS AUTO: ABNORMAL /HPF

## 2025-06-09 PROCEDURE — 81015 MICROSCOPIC EXAM OF URINE: CPT

## 2025-06-09 PROCEDURE — 85025 COMPLETE CBC W/AUTO DIFF WBC: CPT

## 2025-06-09 PROCEDURE — 36415 COLL VENOUS BLD VENIPUNCTURE: CPT

## 2025-06-09 PROCEDURE — 83036 HEMOGLOBIN GLYCOSYLATED A1C: CPT | Mod: PBBFAC

## 2025-06-09 PROCEDURE — 80053 COMPREHEN METABOLIC PANEL: CPT

## 2025-06-09 PROCEDURE — 80061 LIPID PANEL: CPT

## 2025-06-09 PROCEDURE — 92228 IMG RTA DETC/MNTR DS PHY/QHP: CPT | Mod: PBBFAC

## 2025-06-09 PROCEDURE — 92228 IMG RTA DETC/MNTR DS PHY/QHP: CPT | Mod: TC,PBBFAC | Performed by: STUDENT IN AN ORGANIZED HEALTH CARE EDUCATION/TRAINING PROGRAM

## 2025-06-09 PROCEDURE — 82570 ASSAY OF URINE CREATININE: CPT

## 2025-06-09 PROCEDURE — 99215 OFFICE O/P EST HI 40 MIN: CPT | Mod: PBBFAC,25

## 2025-06-09 RX ORDER — LORATADINE 10 MG/1
10 TABLET ORAL DAILY
COMMUNITY
Start: 2025-04-19

## 2025-06-09 RX ORDER — ATORVASTATIN CALCIUM 80 MG/1
80 TABLET, FILM COATED ORAL DAILY
Qty: 30 TABLET | Refills: 3 | Status: SHIPPED | OUTPATIENT
Start: 2025-06-09

## 2025-06-09 RX ORDER — AMLODIPINE BESYLATE 10 MG/1
10 TABLET ORAL DAILY
Qty: 90 TABLET | Refills: 3 | Status: SHIPPED | OUTPATIENT
Start: 2025-06-09

## 2025-06-09 RX ORDER — METFORMIN HYDROCHLORIDE 1000 MG/1
1000 TABLET ORAL 2 TIMES DAILY
Qty: 60 TABLET | Refills: 4 | Status: SHIPPED | OUTPATIENT
Start: 2025-06-09

## 2025-06-09 RX ORDER — METOPROLOL SUCCINATE 25 MG/1
25 TABLET, EXTENDED RELEASE ORAL DAILY
Qty: 90 TABLET | Refills: 3 | Status: CANCELLED | OUTPATIENT
Start: 2025-06-09

## 2025-06-09 NOTE — PROGRESS NOTES
"U Internal Medicine Clinic Visit    Chief Complaint:      Diabetes (DM f/u)     Subjective:     HPI:  Patient is a 57 year old female with PMH of hypothyroidism 2/2 thyroidectomy, postsurgical hypoparathyroidism, HTN, HLD, DM Type II here for clinic f/u.    Continues to follow Endocrinology with no recent changes in treatment plan. Patient states she has gained some weight and will work on reducing weight. She is doing well otherwise. No acute complaints. No episodes of hypoglycemia.     No more menstrual cycle since age 55.     Denies chest pain, palpitations, n/v, cough, SOB, abdominal pain, diarrhea, melena, hematochezia, dysuria, tingling/numbness, or rash.     Review of Systems  12 point ROS negative unless mentioned above    Objective:   Last 24 Hour Vital Signs:  Vitals  BP: 136/70  Temp: 98 °F (36.7 °C)  Temp Source: Oral  Pulse: 65  Resp: 18  SpO2: 98 %  Height: 5' 4" (162.6 cm)  Weight: 71.1 kg (156 lb 12.8 oz)    Physical Examination:    General: Well nourished, well developed in NAD  HEENT: PERRL, NC/AT, erythematous left auditory canal with pain on manipulation but no cerumen, pus or drainage noted. Right ear wnl  Respiratory: CTAB, normal respiratory effort  Cardiovascular: RRR, no m/r/g  Gastrointestinal: Soft, non-tender, non-distended, active BS, no masses palpable  Musculoskeletal: full range of motion of all extremities/spine without limitation or discomfort  Integumentary: multiple overgrown toenails b/l feet  Neurologic: AAOx4, CN II-XII grossly intact, normal gait      Labs  BMP:   Lab Results   Component Value Date    CO2 29 01/13/2025    BUN 18.9 01/13/2025    CREATININE 0.71 01/13/2025    CALCIUM 8.5 01/13/2025     CBC:   Lab Results   Component Value Date    WBC 4.91 06/20/2024    HGB 13.1 06/20/2024    HCT 38.7 06/20/2024    MCV 89.6 06/20/2024    RDW 13.2 06/20/2024     LFTs:   Lab Results   Component Value Date    ALBUMIN 3.8 01/13/2025    AST 20 06/20/2024    ALT 15 06/20/2024    " "ALKPHOS 67 06/20/2024     FLP:   Lab Results   Component Value Date    CHOL 181 06/20/2024    HDL 67 (H) 06/20/2024    .00 06/20/2024    TRIG 68 06/20/2024    TOTALCHOLEST 3 06/20/2024     DM:   Lab Results   Component Value Date    HGBA1C 6.5 06/20/2024    .9 06/20/2024    CREATININE 0.71 01/13/2025    CREATRANDUR 49.8 11/06/2023    PROTEINURINE <6.8 10/31/2022     Thyroid:   Lab Results   Component Value Date    TSH 0.632 01/13/2025    [id number] 1.12 06/29/2021     Anemia: No results found for: "IRON", "TIBC", "FERRITIN", "[id number]", "FOLATE"          Assessment & Plan:     Hx of Abnormal Uterine Bleeding  -Cystic lesion of right ovary up to 4.2 cm, complex cyst, 3.2 cm calcified fibroid ultrasound back in 2016  -Seen in GYN clinic, noted that AUB was due to perimenopausal period; no longer having symptoms  -Pap smear NIL HPV negative in 2016  - Gyn Referral made     Postsurgical Hypoparathyroidism  Hyperphosphatemia 2/2 Above  -Following in Endocrinology Clinic, last seen 1/10/25  - Patient taking  Calcitriol 0.5 mcg in AM only  -Continue OTC Calcium Citrate, 2 pills in the AM and 1 in the PM     Postsurgical Hypothyroidism  -s/p thyroidectomy 2/2 overactive thyroid per patient  -takes synthroid 75mcg daily separate from other medications or food  - last TSH wnl      Type II Diabetes Mellitus  -POCT A1C today 8.1  -Urine Protein/Creatinine ratio: repeat today  -DM eye eam: recommend today  -DM foot exam: done 11/2024  -Annual Lipid Screening: on atorvastatin 80 mg  -BP < 140/90: at goal  -Medication: Continue metformin 1000 mg BID; will add Glipizide 5 mg once daily. Discussed Ozempic but patient not amenable to start that just yet.       HLD  - Lipid panel (6/20/24): Chol 181, HDL 67, Tri 68,   - Continue Atorvastatin 80 mg daily  - repeat lab today      Primary HTN  -well controlled  -Continue amlodipine 10 mg daily and HCTZ 25 mg daily. Discussed patient needs to be on ACEi or ARB " given her DM status. Will order UA and microalbumin/protein ratio and switch metoprolol to ACEi      Health Maintenance  -Cervical Cancer Screening: Pap smear NIL HPV negative in 2016, recommend reschedule GYN for appt  -Breast Cancer Screening: ordered today  -Colon Cancer Screening: Colonoscopy 11/2020 done at outside facility, found to be negative; repeat in 10 years in 2030  -Immunizations:    -TDap: UTD    -Pneumonia: Prevnar 20 UTD 5/17/23    -Covid: UTD    -Shingles: Shingrex UTD  -HCV lifetime screening: negative  -HIV lifetime screening: negative        Labs: CBC, CMP, lipid, POCT A1C, UA, Urine microalbumin/cr ratio  Imaging: mammogram, DM eye exam    RTC 3 months    Kurt Hanna MD  Internal Medicine - PGY-2

## 2025-06-09 NOTE — PROGRESS NOTES
I have reveiwed and agree with the resident's findings, including all diagnostic interpretations and plans as written.    We will start glipizide for her rise in A1C. We discussed GLP with her but she was hesitant to start this. Will check urine microproteinuria studies to see if she would benefit from ACEi and then adjust BP meds accordingly. She likely doesn't need to be on the metoprolol succinate just for HTN. Will re-evaluate her medication regimen once her urine studies come back. Remainder per resident note.

## 2025-06-09 NOTE — PROGRESS NOTES
Addie Kelley is a 59 y.o. female here for a diabetic eye screening with non-dilated fundus photos per Kurt Hanna MD.    Patient cooperative?: Yes  Small pupils?: No  Last eye exam: unknown    For exam results, see Encounter Report.

## 2025-06-19 ENCOUNTER — HOSPITAL ENCOUNTER (OUTPATIENT)
Dept: RADIOLOGY | Facility: HOSPITAL | Age: 60
Discharge: HOME | End: 2025-06-19
Payer: MEDICAID

## 2025-06-19 DIAGNOSIS — Z12.31 ENCOUNTER FOR SCREENING MAMMOGRAM FOR MALIGNANT NEOPLASM OF BREAST: ICD-10-CM

## 2025-06-19 PROCEDURE — 77067 SCR MAMMO BI INCL CAD: CPT | Mod: 26,,, | Performed by: RADIOLOGY

## 2025-06-19 PROCEDURE — 77063 BREAST TOMOSYNTHESIS BI: CPT | Mod: 26,,, | Performed by: RADIOLOGY

## 2025-06-19 PROCEDURE — 77063 BREAST TOMOSYNTHESIS BI: CPT | Mod: TC

## 2025-07-01 ENCOUNTER — HOSPITAL ENCOUNTER (OUTPATIENT)
Dept: RADIOLOGY | Facility: HOSPITAL | Age: 60
Discharge: HOME | End: 2025-07-01
Payer: MEDICAID

## 2025-07-01 DIAGNOSIS — R92.8 ABNORMAL MAMMOGRAM: ICD-10-CM

## 2025-07-01 PROCEDURE — 77065 DX MAMMO INCL CAD UNI: CPT | Mod: TC,RT

## 2025-07-01 PROCEDURE — 77065 DX MAMMO INCL CAD UNI: CPT | Mod: 26,RT,, | Performed by: STUDENT IN AN ORGANIZED HEALTH CARE EDUCATION/TRAINING PROGRAM

## 2025-07-01 PROCEDURE — 77061 BREAST TOMOSYNTHESIS UNI: CPT | Mod: 26,RT,, | Performed by: STUDENT IN AN ORGANIZED HEALTH CARE EDUCATION/TRAINING PROGRAM

## 2025-07-03 ENCOUNTER — TELEPHONE (OUTPATIENT)
Dept: INTERNAL MEDICINE | Facility: CLINIC | Age: 60
End: 2025-07-03
Payer: MEDICAID

## 2025-07-03 ENCOUNTER — RESULTS FOLLOW-UP (OUTPATIENT)
Dept: INTERNAL MEDICINE | Facility: CLINIC | Age: 60
End: 2025-07-03

## 2025-07-03 NOTE — TELEPHONE ENCOUNTER
Placed call to patient x2 attempts. No answer. Unable to leave voicemail due to mailbox being full. Will attempt to contact at a later time.

## 2025-07-03 NOTE — TELEPHONE ENCOUNTER
Called patient regarding mammogram results and no answer. Unable to leave message due to full voicemail box.

## 2025-07-03 NOTE — TELEPHONE ENCOUNTER
Contacted patient ID and  verified. Patient informed of normal mammogram results. Patient verbalized complete understanding.

## 2025-07-03 NOTE — TELEPHONE ENCOUNTER
Attempt to contact patient at listed number. Unable to leave voice message as patient mail box in full.

## 2025-07-03 NOTE — TELEPHONE ENCOUNTER
----- Message from Kurt Hanna sent at 7/3/2025  8:40 AM CDT -----  Please let patient know that her right mammogram is normal and will repeat annually. Thanks.   ----- Message -----  From: Interface, Rad Results In  Sent: 7/2/2025   6:55 AM CDT  To: Kurt Hanna MD

## 2025-07-07 ENCOUNTER — OFFICE VISIT (OUTPATIENT)
Dept: ENDOCRINOLOGY | Facility: CLINIC | Age: 60
End: 2025-07-07
Payer: MEDICAID

## 2025-07-07 ENCOUNTER — LAB VISIT (OUTPATIENT)
Dept: LAB | Facility: HOSPITAL | Age: 60
End: 2025-07-07
Attending: INTERNAL MEDICINE
Payer: MEDICAID

## 2025-07-07 VITALS
RESPIRATION RATE: 18 BRPM | DIASTOLIC BLOOD PRESSURE: 77 MMHG | HEIGHT: 64 IN | BODY MASS INDEX: 26.39 KG/M2 | WEIGHT: 154.56 LBS | SYSTOLIC BLOOD PRESSURE: 121 MMHG | HEART RATE: 75 BPM | TEMPERATURE: 99 F

## 2025-07-07 DIAGNOSIS — E89.0 POSTSURGICAL HYPOTHYROIDISM: ICD-10-CM

## 2025-07-07 DIAGNOSIS — E89.0 POSTSURGICAL HYPOTHYROIDISM: Primary | ICD-10-CM

## 2025-07-07 DIAGNOSIS — E89.2 POSTSURGICAL HYPOPARATHYROIDISM: ICD-10-CM

## 2025-07-07 LAB
ALBUMIN SERPL-MCNC: 3.9 G/DL (ref 3.4–4.8)
BUN SERPL-MCNC: 18 MG/DL (ref 9.8–20.1)
CALCIUM SERPL-MCNC: 7.9 MG/DL (ref 8.4–10.2)
CHLORIDE SERPL-SCNC: 103 MMOL/L (ref 98–107)
CO2 SERPL-SCNC: 29 MMOL/L (ref 23–31)
CREAT SERPL-MCNC: 0.78 MG/DL (ref 0.55–1.02)
GFR SERPLBLD CREATININE-BSD FMLA CKD-EPI: >60 ML/MIN/1.73/M2
GLUCOSE SERPL-MCNC: 105 MG/DL (ref 82–115)
PHOSPHATE SERPL-MCNC: 4.7 MG/DL (ref 2.3–4.7)
POTASSIUM SERPL-SCNC: 3.5 MMOL/L (ref 3.5–5.1)
SODIUM SERPL-SCNC: 143 MMOL/L (ref 136–145)
TSH SERPL-ACNC: 0.87 UIU/ML (ref 0.35–4.94)

## 2025-07-07 PROCEDURE — 99213 OFFICE O/P EST LOW 20 MIN: CPT | Mod: PBBFAC | Performed by: INTERNAL MEDICINE

## 2025-07-07 PROCEDURE — 80069 RENAL FUNCTION PANEL: CPT

## 2025-07-07 PROCEDURE — 36415 COLL VENOUS BLD VENIPUNCTURE: CPT

## 2025-07-07 PROCEDURE — 84443 ASSAY THYROID STIM HORMONE: CPT

## 2025-07-07 NOTE — PROGRESS NOTES
Endocrinology RESIDENT [facility]IC  CLINIC NOTE    Patient Name: Addie Kelley  YOB: 1965    PRESENTING HISTORY       History of Present Illness:  Ms. Addie Kelley is a 59 y.o. female presenting to [facility] Endocrinology clinic for follow up visit. Follows us for hypothyroidism, hypoparathryoidism, and low vitamin D secondary to thyroidectomy in 2000.     She presents today and is doing well, no complaints. She's taking her medications.  Calcium citrate b.i.d.. She also takes calcitriol 0.5 mcg in the morning. She denies cramping, heat/cold intolerance, diarrhea/constipation, tremors, or numbness. Did not complete labs prior to this visit so will obtain labs today.  Most recent thyroid studies on 01/2025 within normal limits.  Calcium levels last month within normal limits.      ROS  As above    PAST HISTORY:     Past Medical History:   Diagnosis Date    Diabetes mellitus, type 2     Hyperlipidemia     Hypertension     Thyroid disease        Past Surgical History:   Procedure Laterality Date    THYROIDECTOMY Bilateral 2000    TUBAL LIGATION         Family History   Problem Relation Name Age of Onset    Hypertension Mother      Thyroid disease Mother      Hypertension Father      No Known Problems Brother         Social History     Socioeconomic History    Marital status:    Tobacco Use    Smoking status: Never    Smokeless tobacco: Never   Substance and Sexual Activity    Alcohol use: Not Currently    Drug use: Never    Sexual activity: Not Currently     Partners: Male     Social Drivers of Health     Financial Resource Strain: Low Risk  (5/16/2024)    Overall Financial Resource Strain (CARDIA)     Difficulty of Paying Living Expenses: Not hard at all   Food Insecurity: No Food Insecurity (5/16/2024)    Hunger Vital Sign     Worried About Running Out of Food in the Last Year: Never true     Ran Out of Food in the Last Year: Never true   Transportation Needs: No Transportation Needs (11/1/2022)  "   PRAPARE - Transportation     Lack of Transportation (Medical): No     Lack of Transportation (Non-Medical): No   Physical Activity: Sufficiently Active (6/9/2025)    Exercise Vital Sign     Days of Exercise per Week: 3 days     Minutes of Exercise per Session: 60 min   Stress: No Stress Concern Present (5/16/2024)    [employer] - Occupational Stress Questionnaire     Feeling of Stress : Not at all   Housing Stability: Low Risk  (6/9/2025)    Housing Stability Vital Sign     Unable to Pay for Housing in the Last Year: No     Number of Times Moved in the Last Year: 1     Homeless in the Last Year: No       MEDICATIONS & ALLERGIES:     Current Outpatient Medications on File Prior to Visit   Medication Sig    amLODIPine (NORVASC) 10 MG tablet Take 1 tablet (10 mg total) by mouth once daily.    atorvastatin (LIPITOR) 80 MG tablet Take 1 tablet (80 mg total) by mouth once daily.    calcitRIOL (ROCALTROL) 0.5 MCG Cap Take 1 capsule (0.5 mcg total) by mouth every morning.    calcium citrate-vitamin D3 315-200 mg (CITRACAL+D) 315 mg-5 mcg (200 unit) per tablet Take 1 tablet by mouth 2 (two) times daily.    hydroCHLOROthiazide (HYDRODIURIL) 25 MG tablet Take 1 tablet (25 mg total) by mouth once daily.    levothyroxine (SYNTHROID) 75 MCG tablet Take 1 tablet (75 mcg total) by mouth before breakfast.    loratadine (CLARITIN) 10 mg tablet Take 10 mg by mouth once daily.    metFORMIN (GLUCOPHAGE) 1000 MG tablet Take 1 tablet (1,000 mg total) by mouth 2 (two) times daily.    metoprolol succinate (TOPROL-XL) 25 MG 24 hr tablet Take 1 tablet (25 mg total) by mouth once daily.     No current facility-administered medications on file prior to visit.       Review of patient's allergies indicates:   Allergen Reactions    Clonidine Edema       OBJECTIVE:   Vital Signs:  Vitals:    07/07/25 0745   BP: 121/77   Pulse: 75   Resp: 18   Temp: 98.9 °F (37.2 °C)   Weight: 70.1 kg (154 lb 8.7 oz)   Height: 5' 4" " "(1.626 m)         No results found for this or any previous visit (from the past 24 hours).      Physical Exam  General - Appears comfortable, appropriatey conversive   Mental Status - alert and oriented x 3, speaking in logical, relevant sentences   HEENT - no rhinorrhea   Cardiac - RRR, no murmurs, rubs, or gallops; no edema in LE   Respiratory - breathing comfortably; clear to ascultation bilaterally   Abdominal - nondistended, soft, nontender to palpation   Extremities - LE, UE, and joints are nonerythematous and nonswollen   Skin - no rashes or bruises seen on skin      Laboratory  Lab Results   Component Value Date    WBC 5.31 06/09/2025    HGB 13.2 06/09/2025    HCT 40.6 06/09/2025    MCV 90.6 06/09/2025     06/09/2025     No results for input(s): "GLU", "NA", "K", "CL", "CO2", "BUN", "CREATININE", "CALCIUM", "MG" in the last 72 hours.    Lab Results   Component Value Date    INR 0.9 05/20/2021    INR 0.94 11/05/2017    PROTIME 12.3 05/20/2021    PROTIME 12.4 11/05/2017     Lab Results   Component Value Date    HGBA1C 6.5 06/20/2024     No results for input(s): "POCTGLUCOSE" in the last 72 hours.    Current Outpatient Medications   Medication Instructions    amLODIPine (NORVASC) 10 mg, Oral, Daily    atorvastatin (LIPITOR) 80 mg, Oral, Daily    calcitRIOL (ROCALTROL) 0.5 mcg, Oral, Every morning    calcium citrate-vitamin D3 315-200 mg (CITRACAL+D) 315 mg-5 mcg (200 unit) per tablet 1 tablet, Oral, 2 times daily    hydroCHLOROthiazide (HYDRODIURIL) 25 mg, Oral, Daily    levothyroxine (SYNTHROID) 75 mcg, Oral, Before breakfast    loratadine (CLARITIN) 10 mg, Daily    metFORMIN (GLUCOPHAGE) 1,000 mg, Oral, 2 times daily    metoprolol succinate (TOPROL-XL) 25 mg, Oral, Daily         ASSESSMENT & PLAN:     Post-surgical hypothyroidism  -s/p thyroidectomy 2/2 overactive thyroid per patient in 2000.  -takes synthroid 75mcg daily separate from other medications or food  -TFTs wnl on 01/2025  -Follow up TFTs " and adjust synthroid pending results    Post-surgical hypoparathyroidism  Mild hypocalcemia (asymptomatic)  -Continue Calcitriol 0.5 mcg in AM.   -Continue OTC Calcium Citrate, 1 tablet b.i.d.  - currently on hydrochlorothiazide 25 mg q.d.  - last calcium 8.7 on 06/2025      Vitamin D deficiency  (resolved)  - wnl on 01/2025, continue above medications.   Has been replete since June 2017.     Type 2 DM  -Metformin 1000 mg bid  -A1c 8.1 on 06/2025  -continue management per PCP    RTC in 1 year pending repeat labs, consider closer follow up if thyroid studies indicate need for further titration.      Rashmi Luna DO  [facility] Internal Medicine, PGY-II

## 2025-07-07 NOTE — TELEPHONE ENCOUNTER
Labs today were acceptable. However if pt starts experiencing symptoms of hypocalcemia show we have wiggle room to adjust her mgmt.  In the absence of that would continue current mgmt and await 24 hour urine testing results.    Ok to renew her LT4 and calcitriol.

## 2025-07-09 ENCOUNTER — LAB VISIT (OUTPATIENT)
Dept: LAB | Facility: HOSPITAL | Age: 60
End: 2025-07-09
Attending: INTERNAL MEDICINE
Payer: MEDICAID

## 2025-07-09 DIAGNOSIS — E89.0 POSTSURGICAL HYPOTHYROIDISM: ICD-10-CM

## 2025-07-09 DIAGNOSIS — E89.2 POSTSURGICAL HYPOPARATHYROIDISM: ICD-10-CM

## 2025-07-09 LAB
CREAT 24H UR-MCNC: 511.2 MG/DAY (ref 950–2490)
CREAT UR-MCNC: 63.9 MG/DL (ref 45–106)
TOTAL VOLUME  (OHS): 800 ML

## 2025-07-09 PROCEDURE — 82340 ASSAY OF CALCIUM IN URINE: CPT

## 2025-07-09 PROCEDURE — 82570 ASSAY OF URINE CREATININE: CPT

## 2025-07-09 RX ORDER — LEVOTHYROXINE SODIUM 75 UG/1
75 TABLET ORAL
Qty: 90 TABLET | Refills: 3 | Status: SHIPPED | OUTPATIENT
Start: 2025-07-09 | End: 2026-07-09

## 2025-07-09 RX ORDER — CALCITRIOL 0.5 UG/1
0.5 CAPSULE ORAL EVERY MORNING
Qty: 90 CAPSULE | Refills: 0 | Status: SHIPPED | OUTPATIENT
Start: 2025-07-09

## 2025-07-09 NOTE — TELEPHONE ENCOUNTER
Spoke with patient informed labs were acceptable, should she have hypocalcemia symptoms we have some room to make adjustments to her management.  That being said would continue with current regimen, we will refill LT4 and Calcitrol, and wait the results of the 24 hour urine.

## 2025-07-09 NOTE — TELEPHONE ENCOUNTER
Rx sorted and signed, please make them run the 24 hour urine calcium order that was not activated.

## 2025-07-10 LAB
CALCIUM 24H UR-MCNC: 59.2 MG/DAY (ref 100–300)
CALCIUM UR-MCNC: 7.4 MG/DL
TOTAL VOLUME  (OHS): 800 ML

## 2025-07-10 NOTE — TELEPHONE ENCOUNTER
Spoke with Denisha in lab she will check to see if they have urine to run the 24 calcium and will call me if the cannot.

## 2025-07-11 NOTE — TELEPHONE ENCOUNTER
I called pt and discussed the following results and recommendations  24 hour urine was acceptable and is safe to continue current regimen for her hypoparathyroidism. Would continue her thyroid and parathyroid mgmt per visit and plan to keep f/u with TSH and renal panel prior, call earlier should signs/symptoms warrant.     Pt verbalizes understanding

## 2025-07-11 NOTE — TELEPHONE ENCOUNTER
24 hour urine was acceptable and is safe to continue current regimen for her hypoparathyroidism.  Would continue her thyroid and parathyroid mgmt per visit and plan to keep f/u with TSH and renal panel prior, call earlier should signs/symptoms warrant.